# Patient Record
Sex: FEMALE | Race: WHITE
[De-identification: names, ages, dates, MRNs, and addresses within clinical notes are randomized per-mention and may not be internally consistent; named-entity substitution may affect disease eponyms.]

---

## 2018-02-28 ENCOUNTER — HOSPITAL ENCOUNTER (OUTPATIENT)
Dept: HOSPITAL 61 - KCIC MRI | Age: 68
Discharge: HOME | End: 2018-02-28
Attending: FAMILY MEDICINE
Payer: COMMERCIAL

## 2018-02-28 DIAGNOSIS — M43.16: ICD-10-CM

## 2018-02-28 DIAGNOSIS — M25.78: ICD-10-CM

## 2018-02-28 DIAGNOSIS — M48.061: Primary | ICD-10-CM

## 2018-02-28 DIAGNOSIS — M51.36: ICD-10-CM

## 2018-02-28 PROCEDURE — 72148 MRI LUMBAR SPINE W/O DYE: CPT

## 2018-03-05 ENCOUNTER — HOSPITAL ENCOUNTER (OUTPATIENT)
Dept: HOSPITAL 61 - PNCL | Age: 68
Discharge: HOME | End: 2018-03-05
Attending: ANESTHESIOLOGY
Payer: COMMERCIAL

## 2018-03-05 DIAGNOSIS — M48.061: ICD-10-CM

## 2018-03-05 DIAGNOSIS — M51.16: Primary | ICD-10-CM

## 2018-03-05 DIAGNOSIS — R53.83: ICD-10-CM

## 2018-03-19 ENCOUNTER — HOSPITAL ENCOUNTER (OUTPATIENT)
Dept: HOSPITAL 61 - KCIC | Age: 68
Discharge: HOME | End: 2018-03-19
Attending: NEUROLOGICAL SURGERY
Payer: COMMERCIAL

## 2018-03-19 ENCOUNTER — HOSPITAL ENCOUNTER (OUTPATIENT)
Dept: HOSPITAL 61 - PNCL | Age: 68
Discharge: HOME | End: 2018-03-19
Attending: ANESTHESIOLOGY
Payer: COMMERCIAL

## 2018-03-19 DIAGNOSIS — M81.8: ICD-10-CM

## 2018-03-19 DIAGNOSIS — M16.0: ICD-10-CM

## 2018-03-19 DIAGNOSIS — E11.9: ICD-10-CM

## 2018-03-19 DIAGNOSIS — Z88.8: ICD-10-CM

## 2018-03-19 DIAGNOSIS — M47.897: ICD-10-CM

## 2018-03-19 DIAGNOSIS — J40: ICD-10-CM

## 2018-03-19 DIAGNOSIS — M51.44: ICD-10-CM

## 2018-03-19 DIAGNOSIS — M48.02: ICD-10-CM

## 2018-03-19 DIAGNOSIS — M51.16: Primary | ICD-10-CM

## 2018-03-19 DIAGNOSIS — M41.82: ICD-10-CM

## 2018-03-19 DIAGNOSIS — M25.78: ICD-10-CM

## 2018-03-19 DIAGNOSIS — I10: ICD-10-CM

## 2018-03-19 DIAGNOSIS — M47.12: Primary | ICD-10-CM

## 2018-03-19 PROCEDURE — 73502 X-RAY EXAM HIP UNI 2-3 VIEWS: CPT

## 2018-03-19 PROCEDURE — 72141 MRI NECK SPINE W/O DYE: CPT

## 2018-03-19 PROCEDURE — 72146 MRI CHEST SPINE W/O DYE: CPT

## 2018-03-19 PROCEDURE — 62323 NJX INTERLAMINAR LMBR/SAC: CPT

## 2018-04-02 ENCOUNTER — HOSPITAL ENCOUNTER (OUTPATIENT)
Dept: HOSPITAL 61 - SURGPAT | Age: 68
Discharge: HOME | End: 2018-04-02
Attending: ORTHOPAEDIC SURGERY
Payer: COMMERCIAL

## 2018-04-02 DIAGNOSIS — M19.012: ICD-10-CM

## 2018-04-02 DIAGNOSIS — I10: ICD-10-CM

## 2018-04-02 DIAGNOSIS — R94.31: ICD-10-CM

## 2018-04-02 DIAGNOSIS — M19.011: ICD-10-CM

## 2018-04-02 DIAGNOSIS — Z01.818: Primary | ICD-10-CM

## 2018-04-02 LAB
ADD MAN DIFF?: NO
ALBUMIN SERPL-MCNC: 3.5 G/DL (ref 3.4–5)
ANION GAP SERPL CALC-SCNC: 11 MMOL/L (ref 6–14)
BACTERIA,URINE: (no result) /HPF
BASO #: 0.1 X10^3/UL (ref 0–0.2)
BASO %: 1 % (ref 0–3)
BILIRUBIN,URINE: NEGATIVE
BLOOD UREA NITROGEN: 36 MG/DL (ref 7–20)
CALCIUM: 9.6 MG/DL (ref 8.5–10.1)
CHLORIDE: 99 MMOL/L (ref 98–107)
CLARITY,URINE: CLEAR
CO2 SERPL-SCNC: 23 MMOL/L (ref 21–32)
COLOR,URINE: YELLOW
CREAT SERPL-MCNC: 1 MG/DL (ref 0.6–1)
EOS #: 0.2 X10^3/UL (ref 0–0.7)
EOS %: 1 % (ref 0–3)
GFR SERPLBLD BASED ON 1.73 SQ M-ARVRAT: 55.5 ML/MIN
GLUCOSE SERPL-MCNC: 155 MG/DL (ref 70–99)
GLUCOSE,URINE: 100 MG/DL
HCG SERPL-ACNC: 13.8 X10^3/UL (ref 4–11)
HEMATOCRIT: 40.3 % (ref 36–47)
HEMOGLOBIN: 13.4 G/DL (ref 12–15.5)
INR: 1 (ref 0.8–1.1)
LYMPH #: 2.6 X10^3/UL (ref 1–4.8)
LYMPH %: 19 % (ref 24–48)
MEAN CORPUSCULAR HEMOGLOBIN: 28 PG (ref 25–35)
MEAN CORPUSCULAR HGB CONC: 33 G/DL (ref 31–37)
MEAN CORPUSCULAR VOLUME: 85 FL (ref 79–100)
MONO #: 0.8 X10^3/UL (ref 0–1.1)
MONO %: 6 % (ref 0–9)
NEUT #: 10.2 X10^3UL (ref 1.8–7.7)
NEUT %: 74 % (ref 31–73)
NITRITE,URINE: NEGATIVE
PARTIAL THROMBOPLASTIN TIME: 28 SEC (ref 24–38)
PH,URINE: 5
PLATELET COUNT: 449 X10^3/UL (ref 140–400)
POTASSIUM SERPL-SCNC: 4.2 MMOL/L (ref 3.5–5.1)
PROTEIN,URINE: NEGATIVE MG/DL
PROTHROMBIN TIME PATIENT: 12.5 SEC (ref 11.7–14)
RBC,URINE: 0 /HPF (ref 0–2)
RED BLOOD COUNT: 4.75 X10^6/UL (ref 3.5–5.4)
RED CELL DISTRIBUTION WIDTH: 14.8 % (ref 11.5–14.5)
SEDIMENTATION RATE: 23 (ref 0–25)
SODIUM: 133 MMOL/L (ref 136–145)
SPECIFIC GRAVITY,URINE: 1.01
SQUAMOUS EPITHELIAL CELL,UR: (no result) /LPF
UROBILINOGEN,URINE: 0.2 MG/DL
WBC,URINE: (no result) /HPF (ref 0–4)

## 2018-04-02 PROCEDURE — 87641 MR-STAPH DNA AMP PROBE: CPT

## 2018-04-02 PROCEDURE — 83036 HEMOGLOBIN GLYCOSYLATED A1C: CPT

## 2018-04-02 PROCEDURE — 85651 RBC SED RATE NONAUTOMATED: CPT

## 2018-04-02 PROCEDURE — 85610 PROTHROMBIN TIME: CPT

## 2018-04-02 PROCEDURE — 93005 ELECTROCARDIOGRAM TRACING: CPT

## 2018-04-02 PROCEDURE — 82040 ASSAY OF SERUM ALBUMIN: CPT

## 2018-04-02 PROCEDURE — 87086 URINE CULTURE/COLONY COUNT: CPT

## 2018-04-02 PROCEDURE — 85025 COMPLETE CBC W/AUTO DIFF WBC: CPT

## 2018-04-02 PROCEDURE — 85730 THROMBOPLASTIN TIME PARTIAL: CPT

## 2018-04-02 PROCEDURE — 81001 URINALYSIS AUTO W/SCOPE: CPT

## 2018-04-02 PROCEDURE — 36415 COLL VENOUS BLD VENIPUNCTURE: CPT

## 2018-04-02 PROCEDURE — 71046 X-RAY EXAM CHEST 2 VIEWS: CPT

## 2018-04-02 PROCEDURE — 80048 BASIC METABOLIC PNL TOTAL CA: CPT

## 2018-04-03 LAB — HEMOGLOBIN A1C: 7.7 % (ref 4.8–5.6)

## 2018-04-19 ENCOUNTER — HOSPITAL ENCOUNTER (OUTPATIENT)
Dept: HOSPITAL 61 - NM | Age: 68
Discharge: HOME | End: 2018-04-19
Attending: INTERNAL MEDICINE
Payer: COMMERCIAL

## 2018-04-19 DIAGNOSIS — Z01.818: Primary | ICD-10-CM

## 2018-04-19 PROCEDURE — 96376 TX/PRO/DX INJ SAME DRUG ADON: CPT

## 2018-04-19 PROCEDURE — 78452 HT MUSCLE IMAGE SPECT MULT: CPT

## 2018-04-19 PROCEDURE — 93306 TTE W/DOPPLER COMPLETE: CPT

## 2018-04-19 PROCEDURE — 96374 THER/PROPH/DIAG INJ IV PUSH: CPT

## 2018-04-19 PROCEDURE — 93017 CV STRESS TEST TRACING ONLY: CPT

## 2018-04-19 PROCEDURE — 96375 TX/PRO/DX INJ NEW DRUG ADDON: CPT

## 2018-04-19 RX ADMIN — REGADENOSON 1 MG: 0.08 INJECTION, SOLUTION INTRAVENOUS at 10:26

## 2018-04-24 ENCOUNTER — HOSPITAL ENCOUNTER (INPATIENT)
Dept: HOSPITAL 61 - OPSVCIP | Age: 68
LOS: 3 days | Discharge: NURSING, REHAB OR LONG-TERM CARE | DRG: 470 | End: 2018-04-27
Attending: ORTHOPAEDIC SURGERY | Admitting: ORTHOPAEDIC SURGERY
Payer: COMMERCIAL

## 2018-04-24 DIAGNOSIS — I48.91: ICD-10-CM

## 2018-04-24 DIAGNOSIS — M16.11: Primary | ICD-10-CM

## 2018-04-24 DIAGNOSIS — M48.00: ICD-10-CM

## 2018-04-24 DIAGNOSIS — E11.9: ICD-10-CM

## 2018-04-24 DIAGNOSIS — I10: ICD-10-CM

## 2018-04-24 LAB
INR: 1.1 (ref 0.8–1.1)
PARTIAL THROMBOPLASTIN TIME: 33 SEC (ref 24–38)
POC GLUCOSE: 148 MG/DL (ref 70–99)
POC GLUCOSE: 188 MG/DL (ref 70–99)
POC GLUCOSE: 212 MG/DL (ref 70–99)
PROTHROMBIN TIME PATIENT: 13.8 SEC (ref 11.7–14)

## 2018-04-24 PROCEDURE — 82962 GLUCOSE BLOOD TEST: CPT

## 2018-04-24 PROCEDURE — 85018 HEMOGLOBIN: CPT

## 2018-04-24 PROCEDURE — 36415 COLL VENOUS BLD VENIPUNCTURE: CPT

## 2018-04-24 PROCEDURE — 97116 GAIT TRAINING THERAPY: CPT

## 2018-04-24 PROCEDURE — 97150 GROUP THERAPEUTIC PROCEDURES: CPT

## 2018-04-24 PROCEDURE — 86850 RBC ANTIBODY SCREEN: CPT

## 2018-04-24 PROCEDURE — 97535 SELF CARE MNGMENT TRAINING: CPT

## 2018-04-24 PROCEDURE — 73501 X-RAY EXAM HIP UNI 1 VIEW: CPT

## 2018-04-24 PROCEDURE — 97166 OT EVAL MOD COMPLEX 45 MIN: CPT

## 2018-04-24 PROCEDURE — 85014 HEMATOCRIT: CPT

## 2018-04-24 PROCEDURE — 86900 BLOOD TYPING SEROLOGIC ABO: CPT

## 2018-04-24 PROCEDURE — 0SR90JZ REPLACEMENT OF RIGHT HIP JOINT WITH SYNTHETIC SUBSTITUTE, OPEN APPROACH: ICD-10-PCS

## 2018-04-24 PROCEDURE — 97162 PT EVAL MOD COMPLEX 30 MIN: CPT

## 2018-04-24 PROCEDURE — 85730 THROMBOPLASTIN TIME PARTIAL: CPT

## 2018-04-24 PROCEDURE — 86901 BLOOD TYPING SEROLOGIC RH(D): CPT

## 2018-04-24 PROCEDURE — 72170 X-RAY EXAM OF PELVIS: CPT

## 2018-04-24 PROCEDURE — 85610 PROTHROMBIN TIME: CPT

## 2018-04-24 PROCEDURE — 97530 THERAPEUTIC ACTIVITIES: CPT

## 2018-04-24 RX ADMIN — Medication 1 UNIT: at 10:57

## 2018-04-24 RX ADMIN — FENTANYL CITRATE 1 MCG: 50 INJECTION INTRAMUSCULAR; INTRAVENOUS at 10:53

## 2018-04-24 RX ADMIN — AZELASTINE HYDROCHLORIDE 1 SPRAY: 137 SPRAY, METERED NASAL at 21:07

## 2018-04-24 RX ADMIN — LISINOPRIL 1 MG: 20 TABLET ORAL at 14:00

## 2018-04-24 RX ADMIN — MORPHINE SULFATE 1 MG: 4 INJECTION, SOLUTION INTRAMUSCULAR; INTRAVENOUS at 11:16

## 2018-04-24 RX ADMIN — FENTANYL CITRATE 1 MCG: 50 INJECTION INTRAMUSCULAR; INTRAVENOUS at 10:31

## 2018-04-24 RX ADMIN — TRANEXAMIC ACID 1 MLS/HR: 100 INJECTION, SOLUTION INTRAVENOUS at 09:30

## 2018-04-24 RX ADMIN — WARFARIN SODIUM 1 MG: 7.5 TABLET ORAL at 17:30

## 2018-04-24 RX ADMIN — SENNOSIDES AND DOCUSATE SODIUM 1 TAB: 8.6; 5 TABLET ORAL at 13:39

## 2018-04-24 RX ADMIN — MORPHINE SULFATE 1: 10 INJECTION INTRAMUSCULAR; INTRAVENOUS; SUBCUTANEOUS at 08:01

## 2018-04-24 RX ADMIN — TRANEXAMIC ACID 1 MLS/HR: 100 INJECTION, SOLUTION INTRAVENOUS at 07:40

## 2018-04-24 RX ADMIN — SODIUM CHLORIDE, SODIUM LACTATE, POTASSIUM CHLORIDE, AND CALCIUM CHLORIDE 1 MLS/HR: .6; .31; .03; .02 INJECTION, SOLUTION INTRAVENOUS at 07:21

## 2018-04-24 RX ADMIN — Medication 1 MG: at 17:30

## 2018-04-24 RX ADMIN — CELECOXIB 1 MG: 200 CAPSULE ORAL at 21:06

## 2018-04-24 RX ADMIN — DEXTROSE AND SODIUM CHLORIDE 1 MLS/HR: 5; .45 INJECTION, SOLUTION INTRAVENOUS at 17:05

## 2018-04-24 RX ADMIN — DEXTROSE AND SODIUM CHLORIDE 1 MLS/HR: 5; .45 INJECTION, SOLUTION INTRAVENOUS at 07:05

## 2018-04-24 RX ADMIN — KETOROLAC TROMETHAMINE 1 MLS/HR: 30 INJECTION, SOLUTION INTRAMUSCULAR at 17:32

## 2018-04-24 RX ADMIN — FENTANYL CITRATE 1 MCG: 50 INJECTION INTRAMUSCULAR; INTRAVENOUS at 10:45

## 2018-04-24 RX ADMIN — FENTANYL CITRATE 1 MCG: 50 INJECTION INTRAMUSCULAR; INTRAVENOUS at 10:20

## 2018-04-24 RX ADMIN — CELECOXIB 1 MG: 200 CAPSULE ORAL at 07:27

## 2018-04-24 RX ADMIN — MORPHINE SULFATE 1 MG: 4 INJECTION, SOLUTION INTRAMUSCULAR; INTRAVENOUS at 11:32

## 2018-04-25 LAB
HEMATOCRIT: 27.4 % (ref 36–47)
HEMOGLOBIN: 9.6 G/DL (ref 12–15.5)
INR: 1.6 (ref 0.8–1.1)
MEAN CORPUSCULAR HGB CONC: 35 G/DL (ref 31–37)
POC GLUCOSE: 135 MG/DL (ref 70–99)
POC GLUCOSE: 159 MG/DL (ref 70–99)
POC GLUCOSE: 165 MG/DL (ref 70–99)
PROTHROMBIN TIME PATIENT: 18.5 SEC (ref 11.7–14)

## 2018-04-25 RX ADMIN — Medication 1 EACH: at 10:51

## 2018-04-25 RX ADMIN — SENNOSIDES AND DOCUSATE SODIUM 1 TAB: 8.6; 5 TABLET ORAL at 08:32

## 2018-04-25 RX ADMIN — CELECOXIB 1 MG: 200 CAPSULE ORAL at 08:32

## 2018-04-25 RX ADMIN — AZELASTINE HYDROCHLORIDE 1 SPRAY: 137 SPRAY, METERED NASAL at 20:59

## 2018-04-25 RX ADMIN — LISINOPRIL 1 MG: 20 TABLET ORAL at 09:00

## 2018-04-25 RX ADMIN — WARFARIN SODIUM 1 MG: 3 TABLET ORAL at 17:18

## 2018-04-25 RX ADMIN — MULTIPLE VITAMINS W/ MINERALS TAB 1 TAB: TAB at 08:32

## 2018-04-25 RX ADMIN — Medication 1 MG: at 08:32

## 2018-04-25 RX ADMIN — CELECOXIB 1 MG: 200 CAPSULE ORAL at 20:59

## 2018-04-25 RX ADMIN — AZELASTINE HYDROCHLORIDE 1 SPRAY: 137 SPRAY, METERED NASAL at 08:35

## 2018-04-25 RX ADMIN — Medication 1 MG: at 17:18

## 2018-04-25 RX ADMIN — KETOROLAC TROMETHAMINE 1 MLS/HR: 30 INJECTION, SOLUTION INTRAMUSCULAR at 05:30

## 2018-04-25 RX ADMIN — Medication 1 EACH: at 10:46

## 2018-04-26 LAB
HEMATOCRIT: 25.8 % (ref 36–47)
HEMOGLOBIN: 8.9 G/DL (ref 12–15.5)
INR: 2.3 (ref 0.8–1.1)
MEAN CORPUSCULAR HGB CONC: 35 G/DL (ref 31–37)
POC GLUCOSE: 102 MG/DL (ref 70–99)
POC GLUCOSE: 103 MG/DL (ref 70–99)
POC GLUCOSE: 112 MG/DL (ref 70–99)
PROTHROMBIN TIME PATIENT: 24.9 SEC (ref 11.7–14)

## 2018-04-26 RX ADMIN — CELECOXIB 1 MG: 200 CAPSULE ORAL at 20:36

## 2018-04-26 RX ADMIN — Medication 1 MG: at 17:38

## 2018-04-26 RX ADMIN — WARFARIN SODIUM 1 MG: 1 TABLET ORAL at 17:39

## 2018-04-26 RX ADMIN — Medication 1 EACH: at 14:06

## 2018-04-26 RX ADMIN — AZELASTINE HYDROCHLORIDE 1 SPRAY: 137 SPRAY, METERED NASAL at 17:41

## 2018-04-26 RX ADMIN — Medication 1 MG: at 08:08

## 2018-04-26 RX ADMIN — POLYETHYLENE GLYCOL 3350 1 GM: 17 POWDER, FOR SOLUTION ORAL at 08:08

## 2018-04-26 RX ADMIN — MULTIPLE VITAMINS W/ MINERALS TAB 1 TAB: TAB at 08:08

## 2018-04-26 RX ADMIN — AZELASTINE HYDROCHLORIDE 1 SPRAY: 137 SPRAY, METERED NASAL at 20:36

## 2018-04-26 RX ADMIN — SENNOSIDES AND DOCUSATE SODIUM 1 TAB: 8.6; 5 TABLET ORAL at 08:08

## 2018-04-26 RX ADMIN — LISINOPRIL 1 MG: 20 TABLET ORAL at 09:00

## 2018-04-26 RX ADMIN — CELECOXIB 1 MG: 200 CAPSULE ORAL at 08:08

## 2018-04-27 LAB
HEMATOCRIT: 27.6 % (ref 36–47)
HEMOGLOBIN: 9.8 G/DL (ref 12–15.5)
INR: 1.9 (ref 0.8–1.1)
MEAN CORPUSCULAR HGB CONC: 36 G/DL (ref 31–37)
POC GLUCOSE: 112 MG/DL (ref 70–99)
PROTHROMBIN TIME PATIENT: 21.5 SEC (ref 11.7–14)

## 2018-04-27 RX ADMIN — Medication 1 MG: at 08:36

## 2018-04-27 RX ADMIN — AZELASTINE HYDROCHLORIDE 1 SPRAY: 137 SPRAY, METERED NASAL at 08:39

## 2018-04-27 RX ADMIN — SENNOSIDES AND DOCUSATE SODIUM 1 TAB: 8.6; 5 TABLET ORAL at 08:36

## 2018-04-27 RX ADMIN — CELECOXIB 1 MG: 200 CAPSULE ORAL at 08:36

## 2018-04-27 RX ADMIN — MULTIPLE VITAMINS W/ MINERALS TAB 1 TAB: TAB at 09:00

## 2018-04-27 RX ADMIN — WARFARIN SODIUM 1 MG: 2 TABLET ORAL at 13:04

## 2018-04-27 RX ADMIN — Medication 1 EACH: at 12:52

## 2018-05-01 LAB — POC GLUCOSE: 107 MG/DL (ref 70–99)

## 2018-05-31 ENCOUNTER — HOSPITAL ENCOUNTER (EMERGENCY)
Dept: HOSPITAL 61 - ER | Age: 68
Discharge: HOME | End: 2018-05-31
Payer: COMMERCIAL

## 2018-05-31 DIAGNOSIS — I10: ICD-10-CM

## 2018-05-31 DIAGNOSIS — Z91.041: ICD-10-CM

## 2018-05-31 DIAGNOSIS — E78.00: ICD-10-CM

## 2018-05-31 DIAGNOSIS — E11.9: ICD-10-CM

## 2018-05-31 DIAGNOSIS — I48.91: ICD-10-CM

## 2018-05-31 DIAGNOSIS — Z88.8: ICD-10-CM
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
## 2018-05-31 DIAGNOSIS — R04.0: Primary | ICD-10-CM

## 2018-05-31 DIAGNOSIS — T45.515A: ICD-10-CM

## 2018-05-31 LAB
ADD MAN DIFF?: NO
ALBUMIN SERPL-MCNC: 3.2 G/DL (ref 3.4–5)
ALBUMIN/GLOB SERPL: 0.9 {RATIO} (ref 1–1.7)
ALP SERPL-CCNC: 109 U/L (ref 46–116)
ALT (SGPT): 16 U/L (ref 14–59)
ANION GAP SERPL CALC-SCNC: 12 MMOL/L (ref 6–14)
AST SERPL-CCNC: 17 U/L (ref 15–37)
BACTERIA,URINE: 0 /HPF
BASO #: 0.1 X10^3/UL (ref 0–0.2)
BASO %: 1 % (ref 0–3)
BILIRUBIN,URINE: (no result)
BLOOD UREA NITROGEN: 21 MG/DL (ref 7–20)
BUN/CREAT SERPL: 21 (ref 6–20)
CALCIUM: 9 MG/DL (ref 8.5–10.1)
CHLORIDE: 96 MMOL/L (ref 98–107)
CK SERPL-CCNC: 77 U/L (ref 26–192)
CLARITY,URINE: (no result)
CO2 SERPL-SCNC: 24 MMOL/L (ref 21–32)
COLOR,URINE: (no result)
CREAT SERPL-MCNC: 1 MG/DL (ref 0.6–1)
EOS #: 0.1 X10^3/UL (ref 0–0.7)
EOS %: 1 % (ref 0–3)
GFR SERPLBLD BASED ON 1.73 SQ M-ARVRAT: 55.3 ML/MIN
GLOBULIN SER-MCNC: 3.7 G/DL (ref 2.2–3.8)
GLUCOSE SERPL-MCNC: 146 MG/DL (ref 70–99)
GLUCOSE,URINE: (no result) MG/DL
HCG SERPL-ACNC: 11.4 X10^3/UL (ref 4–11)
HEMATOCRIT: 29.1 % (ref 36–47)
HEMOGLOBIN: 10.2 G/DL (ref 12–15.5)
INR: 7.2 (ref 0.8–1.1)
LYMPH #: 1.4 X10^3/UL (ref 1–4.8)
LYMPH %: 12 % (ref 24–48)
MEAN CORPUSCULAR HEMOGLOBIN: 29 PG (ref 25–35)
MEAN CORPUSCULAR HGB CONC: 35 G/DL (ref 31–37)
MEAN CORPUSCULAR VOLUME: 83 FL (ref 79–100)
MONO #: 0.8 X10^3/UL (ref 0–1.1)
MONO %: 7 % (ref 0–9)
NEUT #: 8.9 X10^3UL (ref 1.8–7.7)
NEUT %: 78 % (ref 31–73)
NITRITE,URINE: (no result)
PARTIAL THROMBOPLASTIN TIME: > 150 SEC (ref 24–38)
PH,URINE: (no result)
PLATELET COUNT: 509 X10^3/UL (ref 140–400)
POTASSIUM SERPL-SCNC: 3.7 MMOL/L (ref 3.5–5.1)
PROTEIN,URINE: (no result) MG/DL
PROTHROMBIN TIME PATIENT: 61 SEC (ref 11.7–14)
RBC,URINE: (no result) /HPF (ref 0–2)
RED BLOOD COUNT: 3.49 X10^6/UL (ref 3.5–5.4)
RED CELL DISTRIBUTION WIDTH: 15.6 % (ref 11.5–14.5)
SODIUM: 132 MMOL/L (ref 136–145)
SPECIFIC GRAVITY,URINE: 1.01
TOTAL BILIRUBIN: 0.5 MG/DL (ref 0.2–1)
TOTAL PROTEIN: 6.9 G/DL (ref 6.4–8.2)
UROBILINOGEN,URINE: (no result) MG/DL
WBC,URINE: (no result) /HPF (ref 0–4)

## 2018-05-31 PROCEDURE — 86901 BLOOD TYPING SEROLOGIC RH(D): CPT

## 2018-05-31 PROCEDURE — 85025 COMPLETE CBC W/AUTO DIFF WBC: CPT

## 2018-05-31 PROCEDURE — 81001 URINALYSIS AUTO W/SCOPE: CPT

## 2018-05-31 PROCEDURE — 86900 BLOOD TYPING SEROLOGIC ABO: CPT

## 2018-05-31 PROCEDURE — 36415 COLL VENOUS BLD VENIPUNCTURE: CPT

## 2018-05-31 PROCEDURE — 80053 COMPREHEN METABOLIC PANEL: CPT

## 2018-05-31 PROCEDURE — 99284 EMERGENCY DEPT VISIT MOD MDM: CPT

## 2018-05-31 PROCEDURE — 85730 THROMBOPLASTIN TIME PARTIAL: CPT

## 2018-05-31 PROCEDURE — 85610 PROTHROMBIN TIME: CPT

## 2018-05-31 PROCEDURE — 82550 ASSAY OF CK (CPK): CPT

## 2018-05-31 PROCEDURE — 86850 RBC ANTIBODY SCREEN: CPT

## 2018-05-31 RX ADMIN — Medication 1 MG: at 13:47

## 2022-03-02 ENCOUNTER — HOSPITAL ENCOUNTER (EMERGENCY)
Dept: HOSPITAL 61 - ER | Age: 72
Discharge: HOME | End: 2022-03-02
Payer: MEDICARE

## 2022-03-02 VITALS — DIASTOLIC BLOOD PRESSURE: 72 MMHG | SYSTOLIC BLOOD PRESSURE: 149 MMHG

## 2022-03-02 VITALS — BODY MASS INDEX: 33.19 KG/M2 | WEIGHT: 180.34 LBS | HEIGHT: 62 IN

## 2022-03-02 DIAGNOSIS — E78.00: ICD-10-CM

## 2022-03-02 DIAGNOSIS — Z88.8: ICD-10-CM

## 2022-03-02 DIAGNOSIS — N28.9: ICD-10-CM

## 2022-03-02 DIAGNOSIS — Z88.5: ICD-10-CM

## 2022-03-02 DIAGNOSIS — I48.91: ICD-10-CM

## 2022-03-02 DIAGNOSIS — I10: ICD-10-CM

## 2022-03-02 DIAGNOSIS — E11.65: ICD-10-CM

## 2022-03-02 DIAGNOSIS — R04.0: Primary | ICD-10-CM

## 2022-03-02 LAB
ALBUMIN SERPL-MCNC: 3 G/DL (ref 3.4–5)
ALBUMIN/GLOB SERPL: 0.7 {RATIO} (ref 1–1.7)
ALP SERPL-CCNC: 113 U/L (ref 46–116)
ALT SERPL-CCNC: 23 U/L (ref 14–59)
ANION GAP SERPL CALC-SCNC: 11 MMOL/L (ref 6–14)
AST SERPL-CCNC: 14 U/L (ref 15–37)
BASOPHILS # BLD AUTO: 0.1 X10^3/UL (ref 0–0.2)
BASOPHILS NFR BLD: 1 % (ref 0–3)
BILIRUB SERPL-MCNC: 0.4 MG/DL (ref 0.2–1)
BUN SERPL-MCNC: 30 MG/DL (ref 7–20)
BUN/CREAT SERPL: 23 (ref 6–20)
CALCIUM SERPL-MCNC: 8.6 MG/DL (ref 8.5–10.1)
CHLORIDE SERPL-SCNC: 93 MMOL/L (ref 98–107)
CO2 SERPL-SCNC: 25 MMOL/L (ref 21–32)
CREAT SERPL-MCNC: 1.3 MG/DL (ref 0.6–1)
EOSINOPHIL NFR BLD: 0.3 X10^3/UL (ref 0–0.7)
EOSINOPHIL NFR BLD: 2 % (ref 0–3)
ERYTHROCYTE [DISTWIDTH] IN BLOOD BY AUTOMATED COUNT: 13.6 % (ref 11.5–14.5)
GFR SERPLBLD BASED ON 1.73 SQ M-ARVRAT: 40.5 ML/MIN
GLUCOSE SERPL-MCNC: 494 MG/DL (ref 70–99)
HCT VFR BLD CALC: 37.1 % (ref 36–47)
HGB BLD-MCNC: 12.3 G/DL (ref 12–15.5)
LYMPHOCYTES # BLD: 1.8 X10^3/UL (ref 1–4.8)
LYMPHOCYTES NFR BLD AUTO: 15 % (ref 24–48)
MCH RBC QN AUTO: 28 PG (ref 25–35)
MCHC RBC AUTO-ENTMCNC: 33 G/DL (ref 31–37)
MCV RBC AUTO: 85 FL (ref 79–100)
MONO #: 0.8 X10^3/UL (ref 0–1.1)
MONOCYTES NFR BLD: 7 % (ref 0–9)
NEUT #: 8.7 X10^3/UL (ref 1.8–7.7)
NEUTROPHILS NFR BLD AUTO: 74 % (ref 31–73)
PLATELET # BLD AUTO: 440 X10^3/UL (ref 140–400)
POTASSIUM SERPL-SCNC: 4.4 MMOL/L (ref 3.5–5.1)
PROT SERPL-MCNC: 7.4 G/DL (ref 6.4–8.2)
RBC # BLD AUTO: 4.35 X10^6/UL (ref 3.5–5.4)
SODIUM SERPL-SCNC: 129 MMOL/L (ref 136–145)
WBC # BLD AUTO: 11.7 X10^3/UL (ref 4–11)

## 2022-03-02 PROCEDURE — 85025 COMPLETE CBC W/AUTO DIFF WBC: CPT

## 2022-03-02 PROCEDURE — 36415 COLL VENOUS BLD VENIPUNCTURE: CPT

## 2022-03-02 PROCEDURE — 80053 COMPREHEN METABOLIC PANEL: CPT

## 2022-03-02 NOTE — PHYS DOC
Past Medical History


Past Medical History:  A-Fib, Diabetes-Type II, High Cholesterol, Hypertension, 

Other


Additional Past Medical Histor:  RECTAL BLEEDING


Past Surgical History:  Other


Additional Past Surgical Histo:  cardiac cath in november, R)hip replacement.


Smoking Status:  Never Smoker


Alcohol Use:  None


Drug Use:  None





General Adult


EDM:


Chief Complaint:  NOSEBLEED





HPI:


HPI:


71-year-old female with prior past medical history of hypertension, hyperlipi

demia and paroxysmal A. fib, presents to the ED with her sister, (patient 

consents to his/her/their knowledge and involvement in pts' medical care), with 

complaints of nosebleed that started earlier today, right nare.  Patient states 

she doesn't take any medications-she took her self off of all of her 

medications.  Has not seen a primary care physician Dr. Andres Montoya in 2-1/2 

years. Reports her blood pressure is normally not this elevated.  Has been 

undergoing significant stress and is currently living with her sister and 

sister's .  Denies any falls or head trauma.





Review of Systems:


Review of Systems:


Constitutional:   Denies fever or chills. []


Eyes:   Denies change in visual acuity. []


HENT:   Denies nasal congestion or sore throat. [] 


Respiratory:   Denies cough or shortness of breath. [] 


Cardiovascular:   Denies chest pain or edema. [] 


GI:   Denies nausea or vomiting


Musculoskeletal:   Denies back pain or joint pain. [] 


Integument:   Denies rash or diaphoresis


Neurologic:   Denies headache, focal weakness or sensory changes. [] 


Psychiatric:  Denies depression or anxiety or suicidal or homicidal ideations





Heart Score:


C/O Chest Pain:  No


Risk Factors:


Risk Factors:  DM, Current or recent (<one month) smoker, HTN, HLP, family 

history of CAD, obesity.


Risk Scores:


Score 0 - 3:  2.5% MACE over next 6 weeks - Discharge Home


Score 4 - 6:  20.3% MACE over next 6 weeks - Admit for Clinical Observation


Score 7 - 10:  72.7% MACE over next 6 weeks - Early Invasive Strategies





Current Medications:





Current Medications








 Medications


  (Trade)  Dose


 Ordered  Sig/Hawa  Start Time


 Stop Time Status Last Admin


Dose Admin


 


 Oxymetazoline HCl


  (Afrin)  2 spray  1X  ONCE  3/2/22 05:00


 3/2/22 05:01 DC 3/2/22 04:51


2 SPRAY











Allergies:


Allergies:





Allergies








Coded Allergies Type Severity Reaction Last Updated Verified


 


  atorvastatin Allergy Intermediate  4/4/18 Yes


 


  enoxaparin Adverse Reaction Severe  6/25/18 Yes


 


  warfarin Adverse Reaction Severe  6/25/18 Yes


 


  adhesive tape Adverse Reaction Intermediate Rash 4/4/18 Yes


 


  diphenhydramine Adverse Reaction Intermediate LIGHTHEADED 11/13/14 No











Physical Exam:


PE:





Constitutional:  no acute distress, non-toxic appearance. 


HENT: Normocephalic, atraumatic, no brisk bleeding in oropharynx, scant blood in

 right nare


Eyes: EOMI, conjunctiva normal, no discharge.  


Neck: Normal range of motion,  supple, 


Cardiovascular: S1/2 present, regular rhythm


Lungs & Thorax: Speaking in full sentences, bilateral equal chest rise, no 

tachypnea or increased work of breathing


Abdomen:  soft, no tenderness, 


Skin: Warm, dry, no erythema, no rash. [] 


Extremities: No tenderness, no cyanosis, 


Neurologic: Alert and oriented X 3, normal motor function, normal sensory 

function, no focal deficits noted. []


Psychologic: Affect normal, judgement normal, mood -appears slightly anxious





Current Patient Data:


Labs:





                                Laboratory Tests








Test


 3/2/22


04:48


 


White Blood Count


 11.7 x10^3/uL


(4.0-11.0)  H


 


Red Blood Count


 4.35 x10^6/uL


(3.50-5.40)


 


Hemoglobin


 12.3 g/dL


(12.0-15.5)


 


Hematocrit


 37.1 %


(36.0-47.0)


 


Mean Corpuscular Volume


 85 fL ()





 


Mean Corpuscular Hemoglobin 28 pg (25-35)  


 


Mean Corpuscular Hemoglobin


Concent 33 g/dL


(31-37)


 


Red Cell Distribution Width


 13.6 %


(11.5-14.5)


 


Platelet Count


 440 x10^3/uL


(140-400)  H


 


Neutrophils (%) (Auto) 74 % (31-73)  H


 


Lymphocytes (%) (Auto) 15 % (24-48)  L


 


Monocytes (%) (Auto) 7 % (0-9)  


 


Eosinophils (%) (Auto) 2 % (0-3)  


 


Basophils (%) (Auto) 1 % (0-3)  


 


Neutrophils # (Auto)


 8.7 x10^3/uL


(1.8-7.7)  H


 


Lymphocytes # (Auto)


 1.8 x10^3/uL


(1.0-4.8)


 


Monocytes # (Auto)


 0.8 x10^3/uL


(0.0-1.1)


 


Eosinophils # (Auto)


 0.3 x10^3/uL


(0.0-0.7)


 


Basophils # (Auto)


 0.1 x10^3/uL


(0.0-0.2)


 


Sodium Level


 129 mmol/L


(136-145)  L


 


Potassium Level


 4.4 mmol/L


(3.5-5.1)


 


Chloride Level


 93 mmol/L


()  L


 


Carbon Dioxide Level


 25 mmol/L


(21-32)


 


Anion Gap 11 (6-14)  


 


Blood Urea Nitrogen


 30 mg/dL


(7-20)  H


 


Creatinine


 1.3 mg/dL


(0.6-1.0)  H


 


Estimated GFR


(Cockcroft-Gault) 40.5  





 


BUN/Creatinine Ratio 23 (6-20)  H


 


Glucose Level


 494 mg/dL


(70-99)  H


 


Calcium Level


 8.6 mg/dL


(8.5-10.1)


 


Total Bilirubin


 0.4 mg/dL


(0.2-1.0)


 


Aspartate Amino Transferase


(AST) 14 U/L (15-37)


L


 


Alanine Aminotransferase (ALT)


 23 U/L (14-59)





 


Alkaline Phosphatase


 113 U/L


()


 


Total Protein


 7.4 g/dL


(6.4-8.2)


 


Albumin


 3.0 g/dL


(3.4-5.0)  L


 


Albumin/Globulin Ratio


 0.7 (1.0-1.7)


L





                                Laboratory Tests


3/2/22 04:48








                                Laboratory Tests


3/2/22 04:48








Vital Signs:





                                   Vital Signs








  Date Time  Temp Pulse Resp B/P (MAP) Pulse Ox O2 Delivery O2 Flow Rate FiO2


 


3/2/22 04:08 98.1 96 20 186/87 (120) 96 Room Air  





 98.1       











EKG:


EKG:


[]





Radiology/Procedures:


Radiology/Procedures:


[]





Course & Med Decision Making:


Course & Med Decision Making


Pertinent Labs and Imaging studies reviewed. (See chart for details)





Concern for spontaneous right epistaxis with hemostasis.  Was observed in the ED

 -had recurrent blooding that spontaneously stopped prior to any intervention.  

Afrin was given.  Repeat blood pressure was 154/74.  Patient not on any 

Coumadin-reports she was only on it for 3 months, A. fib spontaneously resolved.

  Patient was educated regarding her labs including renal insufficiency and 

hyperglycemia.  I encourage patient to follow-up with her primary care physician

 in 1 to 2 weeks to repeat her blood pressure, hemoglobin A1c and labs. She was 

educated on her increased risk of stroke and the benefits of medical management.

  Will discharge home with strict ED return precautions were given for syncope, 

brisk bleeding, or difficulties breathing. Encouraged urgent outpatient follow-

up with PMD.  Life-threatening processes were considered but are low suspicion 

at this time, given history, physical exam and ED workup. Pt was educated on all

 prescription medications and adverse effects.  All patient's questions were 

answered and pt was stable at time of discharge.





Life/limb-threatening differential includes but is not limited to, 

thrombocytopenia, drug related adverse event, gastrointestinal bleeding, 

posterior epistaxis, hemorrhagic shock, DIC, life-threatening rash, arterial 

injury or trauma.





I have spoken with the patient and/or caregivers.  I explained the patient's 

condition, diagnoses and treatment plan based on the information available to me

 at this time.  I have answered the patient and/or caregiver's questions and 

addressed any concerns.  The patient and/or caregivers have a good understanding

 of patient's diagnosis, condition and treatment plan as can be expected at this

 point.  Vital signs have been stable.  Patient's condition is stable and 

appropriate for discharge from the emergency department. 





Patient will pursue further outpatient evaluation with primary care physician or

 other designated or consulting physician as outlined in the discharge 

instructions.  The patient and/or caregivers are agreeable to this plan of care 

and follow-up instructions have been explained in detail.  The patient and/or 

caregivers have received these instructions in written form and have expressed 

an understanding of the discharge instructions.  The patient and/or caregivers 

are aware that any significant change of condition or worsening of symptoms 

should prompt immediate return to this or the closest emergency department or 

call to 911.





[place] Disclaimer:


Dragon Disclaimer:


This electronic medical record was generated, in whole or in part, using a voice

 recognition dictation system.





Departure


Departure


Impression:  


   Primary Impression:  


   Epistaxis


   Additional Impressions:  


   Hyperglycemia


   Renal insufficiency


   Uncontrolled hypertension


Disposition:  01 HOME / SELF CARE / HOMELESS


Condition:  STABLE


Referrals:  


ANDRES HUERTAS MD (PCP)


follow up in 1 week for HgA1c, blood pressure and kidney evaluation


Patient Instructions:  Hyperglycemia, Easy-to-Read, Hypertension, Nosebleed





Additional Instructions:  


EMERGENCY DEPARTMENT GENERAL DISCHARGE INSTRUCTIONS





Thank you for coming to [facility] Emergency Department (ED) 

today and 


trusting us with you care.  We trust that you had a positive experience in our 

Emergency 


Department.  If you wish to speak to the department management, you may call the

 Director at 


[phone].





YOUR FOLLOW UP INSTRUCTIONS ARE AS FOLLOWS:





1.  Do you have a private Doctor?  If you do not have a private doctor, please 

ask for a 


resource list of physicians or clinics that may be able to assist you with 

follow up care.





2.  The Emergency Physicain has interpreted your x-rays.  The X-Ray specialist 

will also 


review them.  If there is a change in the findings, you will be notified in 48 

hours when at 


all possible.





3.  A lab test or culture has been done, your results will be reviewed and you 

will be 


notified if you need a change in treatment.





ADDITIONAL INSTRUCTIONS AND INFORMATION:





1.  Your care today has been supervised by a physician who is specially trained 

in emergency 


care.  Many problems require more than one evaluation for a complete diagnosis 

and 


treatment.  We recommend that you schedule your follow up appointment as 

recommended to 


ensure complete treatment of you illness or injury.  If you are unable to obtain

 follow up 


care and continue to have a problem, or if your condition worsens, we recommend 

that you 


return to the ED.





2.  We are not able to safely determine your condition over the phone nor are we

 able to 


give sound medical advice over the phone.  For these safety reasons, if you call

 for medical 


advice we will ask you to come to the ED for further evaluation.





3.  If you have any questions regarding these discharge instructions please call

 the ED at 


[phone].





SAFETY INFORMATION:





In the interest of safety, wellness, and injury prevention; we encourage you to 

wear your 


sealbelt, if you smoke; quite smoking, and we encourage family to use a 

protective helmet 


for bicycling and other sporting events that present an increased risk for head 

injury.





IF YOUR SYMPTOMS WORSEN OR NEW SYMPTOMS DEVELOP, OR YOU HAVE CONCERNS ABOUT YOUR

 CONDITION; 


OR IF YOUR CONDITION WORSENS WHILE YOU ARE WAITING FOR YOUR FOLLOW UP 

APPOINTMENT; EITHER 


CONTACT YOUR PRIMARY CARE DOCTOR, THE PHYSICIAN WHOSE NAME AND NUMBER YOU WERE 

GIVEN, OR 


RETURN TO THE ED IMMEDIATELY.











JATIN ARANDA DO                Mar 2, 2022 05:25

## 2022-05-29 ENCOUNTER — HOSPITAL ENCOUNTER (INPATIENT)
Dept: HOSPITAL 61 - ER | Age: 72
LOS: 3 days | Discharge: HOME | DRG: 871 | End: 2022-06-01
Attending: INTERNAL MEDICINE | Admitting: INTERNAL MEDICINE
Payer: MEDICARE

## 2022-05-29 VITALS — WEIGHT: 181.88 LBS | HEIGHT: 62 IN | BODY MASS INDEX: 33.47 KG/M2

## 2022-05-29 VITALS — DIASTOLIC BLOOD PRESSURE: 44 MMHG | SYSTOLIC BLOOD PRESSURE: 103 MMHG

## 2022-05-29 VITALS — DIASTOLIC BLOOD PRESSURE: 53 MMHG | SYSTOLIC BLOOD PRESSURE: 102 MMHG

## 2022-05-29 VITALS — SYSTOLIC BLOOD PRESSURE: 150 MMHG | DIASTOLIC BLOOD PRESSURE: 76 MMHG

## 2022-05-29 DIAGNOSIS — E78.5: ICD-10-CM

## 2022-05-29 DIAGNOSIS — N18.9: ICD-10-CM

## 2022-05-29 DIAGNOSIS — N10: ICD-10-CM

## 2022-05-29 DIAGNOSIS — I70.0: ICD-10-CM

## 2022-05-29 DIAGNOSIS — E11.65: ICD-10-CM

## 2022-05-29 DIAGNOSIS — M43.17: ICD-10-CM

## 2022-05-29 DIAGNOSIS — I48.91: ICD-10-CM

## 2022-05-29 DIAGNOSIS — D75.839: ICD-10-CM

## 2022-05-29 DIAGNOSIS — E87.1: ICD-10-CM

## 2022-05-29 DIAGNOSIS — K57.30: ICD-10-CM

## 2022-05-29 DIAGNOSIS — I12.9: ICD-10-CM

## 2022-05-29 DIAGNOSIS — A41.9: Primary | ICD-10-CM

## 2022-05-29 DIAGNOSIS — E11.22: ICD-10-CM

## 2022-05-29 DIAGNOSIS — I25.10: ICD-10-CM

## 2022-05-29 DIAGNOSIS — N17.0: ICD-10-CM

## 2022-05-29 DIAGNOSIS — Z79.84: ICD-10-CM

## 2022-05-29 DIAGNOSIS — N13.6: ICD-10-CM

## 2022-05-29 DIAGNOSIS — E78.00: ICD-10-CM

## 2022-05-29 DIAGNOSIS — Z96.641: ICD-10-CM

## 2022-05-29 DIAGNOSIS — M16.12: ICD-10-CM

## 2022-05-29 LAB
% BANDS: 4 % (ref 0–9)
% LYMPHS: 3 % (ref 24–48)
% MONOS: 5 % (ref 0–10)
% SEGS: 88 % (ref 35–66)
ALBUMIN SERPL-MCNC: 3.1 G/DL (ref 3.4–5)
ALBUMIN/GLOB SERPL: 0.7 {RATIO} (ref 1–1.7)
ALP SERPL-CCNC: 97 U/L (ref 46–116)
ALT SERPL-CCNC: 16 U/L (ref 14–59)
ANION GAP SERPL CALC-SCNC: 12 MMOL/L (ref 6–14)
AST SERPL-CCNC: 10 U/L (ref 15–37)
BACTERIA #/AREA URNS HPF: (no result) /HPF
BASOPHILS # BLD AUTO: 0.1 X10^3/UL (ref 0–0.2)
BASOPHILS NFR BLD: 0 % (ref 0–3)
BILIRUB SERPL-MCNC: 0.4 MG/DL (ref 0.2–1)
BUN SERPL-MCNC: 37 MG/DL (ref 7–20)
BUN/CREAT SERPL: 23 (ref 6–20)
CALCIUM SERPL-MCNC: 9.9 MG/DL (ref 8.5–10.1)
CHLORIDE SERPL-SCNC: 97 MMOL/L (ref 98–107)
CO2 SERPL-SCNC: 21 MMOL/L (ref 21–32)
CREAT SERPL-MCNC: 1.6 MG/DL (ref 0.6–1)
EOSINOPHIL NFR BLD: 0 % (ref 0–3)
EOSINOPHIL NFR BLD: 0 X10^3/UL (ref 0–0.7)
ERYTHROCYTE [DISTWIDTH] IN BLOOD BY AUTOMATED COUNT: 14.8 % (ref 11.5–14.5)
GFR SERPLBLD BASED ON 1.73 SQ M-ARVRAT: 31.8 ML/MIN
GLUCOSE SERPL-MCNC: 393 MG/DL (ref 70–99)
HCT VFR BLD CALC: 36.9 % (ref 36–47)
HGB BLD-MCNC: 12.4 G/DL (ref 12–15.5)
LYMPHOCYTES # BLD: 0.8 X10^3/UL (ref 1–4.8)
LYMPHOCYTES NFR BLD AUTO: 3 % (ref 24–48)
MCH RBC QN AUTO: 28 PG (ref 25–35)
MCHC RBC AUTO-ENTMCNC: 34 G/DL (ref 31–37)
MCV RBC AUTO: 84 FL (ref 79–100)
MONO #: 1 X10^3/UL (ref 0–1.1)
MONOCYTES NFR BLD: 4 % (ref 0–9)
NEUT #: 24.1 X10^3/UL (ref 1.8–7.7)
NEUTROPHILS NFR BLD AUTO: 92 % (ref 31–73)
PLATELET # BLD AUTO: 435 X10^3/UL (ref 140–400)
PLATELET # BLD EST: (no result) 10*3/UL
POTASSIUM SERPL-SCNC: 4.9 MMOL/L (ref 3.5–5.1)
PROT SERPL-MCNC: 7.8 G/DL (ref 6.4–8.2)
RBC # BLD AUTO: 4.41 X10^6/UL (ref 3.5–5.4)
RBC #/AREA URNS HPF: 0 /HPF (ref 0–2)
SODIUM SERPL-SCNC: 130 MMOL/L (ref 136–145)
WBC # BLD AUTO: 26.1 X10^3/UL (ref 4–11)
WBC #/AREA URNS HPF: (no result) /HPF (ref 0–4)

## 2022-05-29 PROCEDURE — 36415 COLL VENOUS BLD VENIPUNCTURE: CPT

## 2022-05-29 PROCEDURE — 80048 BASIC METABOLIC PNL TOTAL CA: CPT

## 2022-05-29 PROCEDURE — 81001 URINALYSIS AUTO W/SCOPE: CPT

## 2022-05-29 PROCEDURE — 83036 HEMOGLOBIN GLYCOSYLATED A1C: CPT

## 2022-05-29 PROCEDURE — G0378 HOSPITAL OBSERVATION PER HR: HCPCS

## 2022-05-29 PROCEDURE — 87086 URINE CULTURE/COLONY COUNT: CPT

## 2022-05-29 PROCEDURE — 80053 COMPREHEN METABOLIC PANEL: CPT

## 2022-05-29 PROCEDURE — 82962 GLUCOSE BLOOD TEST: CPT

## 2022-05-29 PROCEDURE — 87186 SC STD MICRODIL/AGAR DIL: CPT

## 2022-05-29 PROCEDURE — 83735 ASSAY OF MAGNESIUM: CPT

## 2022-05-29 PROCEDURE — 85007 BL SMEAR W/DIFF WBC COUNT: CPT

## 2022-05-29 PROCEDURE — 83605 ASSAY OF LACTIC ACID: CPT

## 2022-05-29 PROCEDURE — 84100 ASSAY OF PHOSPHORUS: CPT

## 2022-05-29 PROCEDURE — 96361 HYDRATE IV INFUSION ADD-ON: CPT

## 2022-05-29 PROCEDURE — 73560 X-RAY EXAM OF KNEE 1 OR 2: CPT

## 2022-05-29 PROCEDURE — 74176 CT ABD & PELVIS W/O CONTRAST: CPT

## 2022-05-29 PROCEDURE — 96374 THER/PROPH/DIAG INJ IV PUSH: CPT

## 2022-05-29 PROCEDURE — 87077 CULTURE AEROBIC IDENTIFY: CPT

## 2022-05-29 PROCEDURE — 85025 COMPLETE CBC W/AUTO DIFF WBC: CPT

## 2022-05-29 RX ADMIN — BACITRACIN SCH MLS/HR: 5000 INJECTION, POWDER, FOR SOLUTION INTRAMUSCULAR at 17:19

## 2022-05-29 RX ADMIN — HEPARIN SODIUM SCH UNIT: 5000 INJECTION, SOLUTION INTRAVENOUS; SUBCUTANEOUS at 21:31

## 2022-05-29 RX ADMIN — INSULIN LISPRO SCH UNITS: 100 INJECTION, SOLUTION INTRAVENOUS; SUBCUTANEOUS at 17:32

## 2022-05-29 NOTE — PDOC1
History and Physical


Date of Service:


DOS:


DATE: 5/29/22 


TIME: 15:21





Chief Complaint:


Chief Complain:


Lower abdominal pain





History of Present Illness:


HPI:


72-year-old female with past medical history of diabetes mellitus type 2, 

dyslipidemia, hypertension who comes in after having left suprapubic and 

inguinal pain after bowel movement.  She also had some discomfort in the area 

after urination.  She does not describe it as burning but more of a pinching and

strain like sensation.  Hit her but bowel movements were nonbloody and she does 

endorse normal passing of gas.  Some nausea and vomiting.  Denies any history of

UTIs, fevers, chest pain, shortness of breath or hematuria.  Patient has had a 

normal colonoscopy in the past.





Past Medical/Surgical History:


PMH/PSH:


Past Medical History:  A-Fib, Diabetes-Type II, High Cholesterol, Hypertension, 

RECTAL BLEEDING


Past Surgical History: cardiac cath in november, R hip replacement.





Allergies:


Allergies:  


Coded Allergies:  


     atorvastatin (Verified  Allergy, Intermediate, 4/4/18)


   MUSCULAR PAIN


     enoxaparin (Verified  Adverse Reaction, Severe, 6/25/18)


   Bleeding out of every orfice


     warfarin (Verified  Adverse Reaction, Severe, 6/25/18)


   bleeding out of every Orfice


     adhesive tape (Verified  Adverse Reaction, Intermediate, Rash, 4/4/18)


     diphenhydramine (Unverified  Adverse Reaction, Intermediate, LIGHTHEADED, 

11/13/14)





Family History:


Family History:


Reviewed with no relative findings in the chart





Social History:


Social History:


Smoking Status:  Never Smoker


Alcohol Use:  None


Drug Use:  None





Current Medications:


Current Medications





Current Medications


Cephalexin HCl (Keflex) 500 mg 1X  ONCE PO  Last administered on 5/29/22at 

13:33;  Start 5/29/22 at 13:15;  Stop 5/29/22 at 13:16;  Status DC


Ondansetron HCl (Zofran Odt) 4 mg 1X  ONCE PO  Last administered on 5/29/22at 

13:33;  Start 5/29/22 at 13:15;  Stop 5/29/22 at 13:16;  Status DC


Sodium Chloride 1,000 ml @  0 mls/hr 1X  ONCE IV ;  Start 5/29/22 at 14:00;  

Stop 5/29/22 at 13:53;  Status DC


Sodium Chloride 1,000 ml @  1,000 mls/hr 1X  ONCE IV  Last administered on 

5/29/22at 13:54;  Start 5/29/22 at 14:00;  Stop 5/29/22 at 14:59;  Status DC


Ceftriaxone Sodium (Rocephin) 1 gm 1X  ONCE IVP  Last administered on 5/29/22at 

14:41;  Start 5/29/22 at 14:00;  Stop 5/29/22 at 14:02;  Status DC


Sodium Chloride 1,000 ml @  1,000 mls/hr 1X  ONCE IV ;  Start 5/29/22 at 15:30; 

Stop 5/29/22 at 16:29;  Status UNV





Active Scripts


Active


Ondansetron Odt (Ondansetron) 4 Mg Tab.rapdis 1 Tab PO PRN Q6-8HRS


Keflex (Cephalexin) 500 Mg Capsule 1 Cap PO BID 7 Days


Reported


Senna-Docusate Sodium Tablet (Sennosides/Docusate Sodium) 1 Each Tablet 1 Each 

PO DAILY


Tramadol Hcl 50 Mg Tablet 50 Mg PO Q4HRS PRN


Azelastine Hcl 137 Mcg/0.137 Ml [place].pump 1 [place] NS BID


Cardizem Cd (Diltiazem Hcl) 240 Mg Cap.er.24h 240 Mg PO DAILY


Lisinopril-Hctz 20-25 Mg Tab (Lisinopril/Hydrochlorothiazide) 1 Each Tablet 1 

Tab PO DAILY


Metformin Hcl 500 Mg Tablet 500 Mg PO BIDWMEALS





ROS:


Review of Systems


Review of System


REVIEW OF SYSTEMS:


GENERAL:  Denies weakness


SKIN:  No bruising, hair changes or rashes.


EYES:  No blurred, double or loss of vision.


NOSE AND THROAT:  No history of nosebleeds, hoarseness or sore throat.


HEART:  No history of palpitations, chest pain or shortness of breath on


exertion.


LUNGS:  Denies cough, hemoptysis, wheezing or shortness of breath.


GASTROINTESTINAL:  Denies changes in appetite, nausea, vomiting, diarrhea or


constipation.


GENITOURINARY:  No history of frequency, urgency, hesitancy or nocturia.


NEUROLOGIC:  Denies history of numbness, tingling, or tremor.


PSYCHIATRIC:  No history of panic, anxiety or depression.


ENDOCRINE:  No history of heat or cold intolerance, polyuria or polydipsia.


EXTREMITIES:  Denies joint pain, pain on walking or stiffness.





Physical Exam:


Vital Signs:





Vital Signs








  Date Time  Temp Pulse Resp B/P (MAP) Pulse Ox O2 Delivery O2 Flow Rate FiO2


 


5/29/22 14:36  89 25 164/72 (102) 97 Room Air  


 


5/29/22 13:27 99.0       





 99.0       








Physcial Exam:


General: Well developed, well nourished, no acute distress, well appearing


HEENT:  Pupils equally round and reactive to light, EOMI, no discharge, normal 

conjunctiva


Neck:  Supple, no nuchal rigidity, no JVD, trachea midline, no tenderness


Cardiac:  RRR, no murmurs, no gallops, no rubs


Chest/Lungs:  CTAB, no wheeze, no rhonchi, no crackles


Abdomen: soft, non-distended, no guarding, no peritoneal signs, mild tenderness 

in the left lower quadrant


Back:  No tenderness


Extremities:  no edema, pulses intact, non-tender,capillary refill <3 sec 

bilateral upper and lower extremities left knee pain.  Limited range of motion 

unable to fully extend left lower extremity,


Neuro:  Alert and oriented x 4, no focal deficits, normal speech





Labs:


Labs:





Laboratory Tests








Test


 5/29/22


11:55 5/29/22


12:41 5/29/22


14:39


 


Urine Collection Type Unknown   


 


Urine Color (Auto) Light yellow   


 


Urine Turbidity Hazy   


 


Urine pH (Auto) 5.5 (<5.0-8.0)   


 


Urine Specific Gravity


 1.017


(1.000-1.030) 


 





 


Urine Protein (Auto)


 Negative mg/dL


(Negative) 


 





 


Urine Glucose (Auto)(UA)


 >=1000 mg/dL


(Negative) 


 





 


Urine Ketones (Auto)


 Negative mg/dL


(Negative) 


 





 


Urine Blood (Auto)


 Trace


(Negative) 


 





 


Urine Nitrite


 Negative


(Negative) 


 





 


Urine Bilirubin (Auto)


 Negative


(Negative) 


 





 


Urine Urobilinogen (Auto)


 Normal mg/dL


(Normal) 


 





 


Urine Leukocyte Esterase


(Auto) Large


(Negative) 


 





 


Urine RBC 0 /HPF (0-2)   


 


Urine WBC


 Tntc /HPF


(0-4) 


 





 


Urine Bacteria


 Many /HPF


(0-FEW) 


 





 


White Blood Count


 


 26.1 x10^3/uL


(4.0-11.0) 





 


Red Blood Count


 


 4.41 x10^6/uL


(3.50-5.40) 





 


Hemoglobin


 


 12.4 g/dL


(12.0-15.5) 





 


Hematocrit


 


 36.9 %


(36.0-47.0) 





 


Mean Corpuscular Volume  84 fL ()  


 


Mean Corpuscular Hemoglobin  28 pg (25-35)  


 


Mean Corpuscular Hemoglobin


Concent 


 34 g/dL


(31-37) 





 


Red Cell Distribution Width


 


 14.8 %


(11.5-14.5) 





 


Platelet Count


 


 435 x10^3/uL


(140-400) 





 


Neutrophils (%) (Auto)  92 % (31-73)  


 


Lymphocytes (%) (Auto)  3 % (24-48)  


 


Monocytes (%) (Auto)  4 % (0-9)  


 


Eosinophils (%) (Auto)  0 % (0-3)  


 


Basophils (%) (Auto)  0 % (0-3)  


 


Neutrophils # (Auto)


 


 24.1 x10^3/uL


(1.8-7.7) 





 


Lymphocytes # (Auto)


 


 0.8 x10^3/uL


(1.0-4.8) 





 


Monocytes # (Auto)


 


 1.0 x10^3/uL


(0.0-1.1) 





 


Eosinophils # (Auto)


 


 0.0 x10^3/uL


(0.0-0.7) 





 


Basophils # (Auto)


 


 0.1 x10^3/uL


(0.0-0.2) 





 


Segmented Neutrophils %  88 % (35-66)  


 


Band Neutrophils %  4 % (0-9)  


 


Lymphocytes %  3 % (24-48)  


 


Monocytes %  5 % (0-10)  


 


Platelet Estimate


 


 Increased


(ADEQUATE) 





 


Sodium Level


 


 130 mmol/L


(136-145) 





 


Potassium Level


 


 4.9 mmol/L


(3.5-5.1) 





 


Chloride Level


 


 97 mmol/L


() 





 


Carbon Dioxide Level


 


 21 mmol/L


(21-32) 





 


Anion Gap  12 (6-14)  


 


Blood Urea Nitrogen


 


 37 mg/dL


(7-20) 





 


Creatinine


 


 1.6 mg/dL


(0.6-1.0) 





 


Estimated GFR


(Cockcroft-Gault) 


 31.8 


 





 


BUN/Creatinine Ratio  23 (6-20)  


 


Glucose Level


 


 393 mg/dL


(70-99) 





 


Calcium Level


 


 9.9 mg/dL


(8.5-10.1) 





 


Total Bilirubin


 


 0.4 mg/dL


(0.2-1.0) 





 


Aspartate Amino Transf


(AST/SGOT) 


 10 U/L (15-37) 


 





 


Alanine Aminotransferase


(ALT/SGPT) 


 16 U/L (14-59) 


 





 


Alkaline Phosphatase


 


 97 U/L


() 





 


Total Protein


 


 7.8 g/dL


(6.4-8.2) 





 


Albumin


 


 3.1 g/dL


(3.4-5.0) 





 


Albumin/Globulin Ratio  0.7 (1.0-1.7)  


 


Lactic Acid Level


 


 


 2.7 mmol/L


(0.4-2.0)








Laboratory Tests








Test


 5/29/22


11:55 5/29/22


12:41 5/29/22


14:39


 


Urine Collection Type Unknown   


 


Urine Color (Auto) Light yellow   


 


Urine Turbidity Hazy   


 


Urine pH (Auto) 5.5 (<5.0-8.0)   


 


Urine Specific Gravity


 1.017


(1.000-1.030) 


 





 


Urine Protein (Auto)


 Negative mg/dL


(Negative) 


 





 


Urine Glucose (Auto)(UA)


 >=1000 mg/dL


(Negative) 


 





 


Urine Ketones (Auto)


 Negative mg/dL


(Negative) 


 





 


Urine Blood (Auto)


 Trace


(Negative) 


 





 


Urine Nitrite


 Negative


(Negative) 


 





 


Urine Bilirubin (Auto)


 Negative


(Negative) 


 





 


Urine Urobilinogen (Auto)


 Normal mg/dL


(Normal) 


 





 


Urine Leukocyte Esterase


(Auto) Large


(Negative) 


 





 


Urine RBC 0 /HPF (0-2)   


 


Urine WBC


 Tntc /HPF


(0-4) 


 





 


Urine Bacteria


 Many /HPF


(0-FEW) 


 





 


White Blood Count


 


 26.1 x10^3/uL


(4.0-11.0) 





 


Red Blood Count


 


 4.41 x10^6/uL


(3.50-5.40) 





 


Hemoglobin


 


 12.4 g/dL


(12.0-15.5) 





 


Hematocrit


 


 36.9 %


(36.0-47.0) 





 


Mean Corpuscular Volume  84 fL ()  


 


Mean Corpuscular Hemoglobin  28 pg (25-35)  


 


Mean Corpuscular Hemoglobin


Concent 


 34 g/dL


(31-37) 





 


Red Cell Distribution Width


 


 14.8 %


(11.5-14.5) 





 


Platelet Count


 


 435 x10^3/uL


(140-400) 





 


Neutrophils (%) (Auto)  92 % (31-73)  


 


Lymphocytes (%) (Auto)  3 % (24-48)  


 


Monocytes (%) (Auto)  4 % (0-9)  


 


Eosinophils (%) (Auto)  0 % (0-3)  


 


Basophils (%) (Auto)  0 % (0-3)  


 


Neutrophils # (Auto)


 


 24.1 x10^3/uL


(1.8-7.7) 





 


Lymphocytes # (Auto)


 


 0.8 x10^3/uL


(1.0-4.8) 





 


Monocytes # (Auto)


 


 1.0 x10^3/uL


(0.0-1.1) 





 


Eosinophils # (Auto)


 


 0.0 x10^3/uL


(0.0-0.7) 





 


Basophils # (Auto)


 


 0.1 x10^3/uL


(0.0-0.2) 





 


Segmented Neutrophils %  88 % (35-66)  


 


Band Neutrophils %  4 % (0-9)  


 


Lymphocytes %  3 % (24-48)  


 


Monocytes %  5 % (0-10)  


 


Platelet Estimate


 


 Increased


(ADEQUATE) 





 


Sodium Level


 


 130 mmol/L


(136-145) 





 


Potassium Level


 


 4.9 mmol/L


(3.5-5.1) 





 


Chloride Level


 


 97 mmol/L


() 





 


Carbon Dioxide Level


 


 21 mmol/L


(21-32) 





 


Anion Gap  12 (6-14)  


 


Blood Urea Nitrogen


 


 37 mg/dL


(7-20) 





 


Creatinine


 


 1.6 mg/dL


(0.6-1.0) 





 


Estimated GFR


(Cockcroft-Gault) 


 31.8 


 





 


BUN/Creatinine Ratio  23 (6-20)  


 


Glucose Level


 


 393 mg/dL


(70-99) 





 


Calcium Level


 


 9.9 mg/dL


(8.5-10.1) 





 


Total Bilirubin


 


 0.4 mg/dL


(0.2-1.0) 





 


Aspartate Amino Transf


(AST/SGOT) 


 10 U/L (15-37) 


 





 


Alanine Aminotransferase


(ALT/SGPT) 


 16 U/L (14-59) 


 





 


Alkaline Phosphatase


 


 97 U/L


() 





 


Total Protein


 


 7.8 g/dL


(6.4-8.2) 





 


Albumin


 


 3.1 g/dL


(3.4-5.0) 





 


Albumin/Globulin Ratio  0.7 (1.0-1.7)  


 


Lactic Acid Level


 


 


 2.7 mmol/L


(0.4-2.0)











Images:


Images


PROCEDURE: CT ABDOMEN PELVIS WO CONTRAST





CT ABDOMEN+PELVIS WO





History: Left lower quadrant pain.





Comparison: CT abdomen and pelvis 6/23/2018





Technique: Noncontrast CT of the abdomen and pelvis. 





Findings:


The lung bases are clear. There are coronary artery calcifications partially 

visualized.





The liver, gallbladder, pancreas and adrenal glands are unremarkable. There is 

punctate calcifications throughout the spleen and a few in the liver which are 

compatible with old granulomatous disease. The right kidney is unremarkable. The

left kidney demonstrates minimal hydronephrosis and edema with perinephric 

adipose stranding. No nephrolithiasis. No hydroureter. No ureterolithiasis 

identified.





Small sliding hiatal hernia versus patulous distal esophagus. The small bowel is

unremarkable. Mild colonic diverticulosis. No evidence of diverticulitis.





The uterus and adnexa are within normal limits. The bladder is unremarkable. 

There is no intra-abdominal free fluid or free air. Mild atherosclerotic 

calcification of the aorta and iliac arteries; no aneurysm. No abdominopelvic 

adenopathy.





Soft tissues are unremarkable. There are multilevel degenerative changes in the 

spine with moderate degenerative anterolisthesis of L5 on S1. Severe left hip 

osteoarthritis. Right total hip arthroplasty changes.





Impression: 





1.  Left kidney edema with mild hydronephrosis and left perinephric fat 

stranding. Findings may relate to pyelonephritis. No radiopaque urolithiasis 

identified.


2.  Colonic diverticulosis without evidence of diverticulitis.


3.  Degenerative changes of the spine and left hip.





Assessment/Plan


Assessment/Plan


Sepsis


Acute pyelonephritis


Thrombocytosis related to infectious etiology


Acute electrolyte derangementhyponatremia, hypochloremia due to volume 

depletion


Acute on chronic kidney injury due to vasomotor nephropathy, baseline creatinine

appears to be 1.3 in March 2022


Lactic acidemia


History of diabetes mellitus type 2


History of dyslipidemia


History of atrial fibrillation, not on any blood thinners or rate control 

medications


History of hypertension





Admit to hospitalist service for further management


Continue IV fluids


Continue IV antibiotics


Pending urine cultures


R ISS and Accu-Cheks


Continue telemetry monitoring


Heparin for DVT prophylaxis


ADA diet


CODE STATUS full


Discussed with RN and SW


Disposition inpatient management as above


DPOA: Sister





Justifications for Admission


Other Justification














TANVIR FORREST MD                  May 29, 2022 15:26

## 2022-05-29 NOTE — RAD
CT ABDOMEN+PELVIS WO



History: Left lower quadrant pain.



Comparison: CT abdomen and pelvis 6/23/2018



Technique: Noncontrast CT of the abdomen and pelvis. 



Findings:

The lung bases are clear. There are coronary artery calcifications partially visualized.



The liver, gallbladder, pancreas and adrenal glands are unremarkable. There is punctate calcification
s throughout the spleen and a few in the liver which are compatible with old granulomatous disease. T
he right kidney is unremarkable. The left kidney demonstrates minimal hydronephrosis and edema with p
erinephric adipose stranding. No nephrolithiasis. No hydroureter. No ureterolithiasis identified.



Small sliding hiatal hernia versus patulous distal esophagus. The small bowel is unremarkable. Mild c
olonic diverticulosis. No evidence of diverticulitis.



The uterus and adnexa are within normal limits. The bladder is unremarkable. There is no intra-abdomi
nal free fluid or free air. Mild atherosclerotic calcification of the aorta and iliac arteries; no an
eurysm. No abdominopelvic adenopathy.



Soft tissues are unremarkable. There are multilevel degenerative changes in the spine with moderate d
egenerative anterolisthesis of L5 on S1. Severe left hip osteoarthritis. Right total hip arthroplasty
 changes.



Impression: 



1.  Left kidney edema with mild hydronephrosis and left perinephric fat stranding. Findings may relat
e to pyelonephritis. No radiopaque urolithiasis identified.

2.  Colonic diverticulosis without evidence of diverticulitis.

3.  Degenerative changes of the spine and left hip.





------

Exposure: One or more of the following individualized dose reduction techniques were utilized for thi
s examination:  

1. Automated exposure control

2. Adjustment of the mA and/or kV according to patient size

3. Use of iterative reconstruction technique.



Electronically signed by: Cristian Denson MD (5/29/2022 3:07 PM) [id number]

## 2022-05-29 NOTE — PHYS DOC
Past Medical History


Past Medical History:  A-Fib, Diabetes-Type II, High Cholesterol, Hypertension, 

Other


Additional Past Medical Histor:  RECTAL BLEEDING


Past Surgical History:  Other


Additional Past Surgical Histo:  cardiac cath in )hip replacement.


Smoking Status:  Never Smoker


Alcohol Use:  None


Drug Use:  None





General Adult


EDM:


Chief Complaint:  GI PROBLEM





HPI:


HPI:


History obtained from patient.  Patient is a 72-year-old female with past 

medical history significant for non-insulin-dependent diabetes, hypertension who

presents with chief complaint of urinary urgency and left lower quadrant 

discomfort with urination.  Notes 2 small bowel movements today.  States they 

were nonbloody.  No she is passing flatus.  Notes some mild nausea but denies 

vomiting.  Denies fevers.  Denies history of UTIs.  Notes history of normal 

colonoscopy.  Denies previous abdominal surgical history.  Has been able to 

tolerate p.o. today.  Denies vaginal bleeding or discharge.  Denies hematuria.  

Denies any low back pain.





Review of Systems:


Review of Systems:


Constitutional:   Denies fever or chills. []


Eyes:   Denies change in visual acuity. []


HENT:   Denies nasal congestion or sore throat. [] 


Respiratory:   Denies cough or shortness of breath. [] 


Cardiovascular:   Denies chest pain or edema. [] 


GI:   Denies abdominal pain, nausea, vomiting, bloody stools or diarrhea. [] 


: Dysuria, urinary urgency


Musculoskeletal:   Denies back pain or joint pain. [] 


Integument:   Denies rash. [] 


Neurologic:   Denies headache, focal weakness or sensory changes. [] 


Endocrine:   Denies polyuria or polydipsia. [] 


Lymphatic:  Denies swollen glands. [] 


Psychiatric:  Denies depression or anxiety. []





Heart Score:


C/O Chest Pain:  No


Risk Factors:


Risk Factors:  DM, Current or recent (<one month) smoker, HTN, HLP, family 

history of CAD, obesity.


Risk Scores:


Score 0 - 3:  2.5% MACE over next 6 weeks - Discharge Home


Score 4 - 6:  20.3% MACE over next 6 weeks - Admit for Clinical Observation


Score 7 - 10:  72.7% MACE over next 6 weeks - Early Invasive Strategies





Allergies:


Allergies:





Allergies








Coded Allergies Type Severity Reaction Last Updated Verified


 


  atorvastatin Allergy Intermediate  18 Yes


 


  enoxaparin Adverse Reaction Severe  18 Yes


 


  warfarin Adverse Reaction Severe  18 Yes


 


  adhesive tape Adverse Reaction Intermediate Rash 18 Yes


 


  diphenhydramine Adverse Reaction Intermediate LIGHTHEADED 14 No











Physical Exam:


PE:





Constitutional: Well developed, well nourished, no acute distress, non-toxic 

appearance. []


HENT: Normocephalic, atraumatic, bilateral external ears normal, oropharynx 

moist, no oral exudates, nose normal. []


Eyes: PERRLA, EOMI, conjunctiva normal, no discharge. [] 


Neck: Normal range of motion, no tenderness, supple, no stridor. [] 


Cardiovascular:Heart rate regular rhythm, no murmur []


Lungs & Thorax:  Bilateral breath sounds clear to auscultation []


Abdomen: soft, nontender, nonacute abdomen.  No involuntary guarding or rigidity

 noted.  No acute peritonitis.


Skin: Warm, dry, no erythema, no rash. [] 


Back: No tenderness, no CVA tenderness. [] 


Extremities: No tenderness, no cyanosis, no clubbing, ROM intact, no edema. [] 


Neurologic: Alert and oriented X 3, normal motor function, normal sensory 

function, no focal deficits noted. []


Psychologic: Affect normal, judgement normal, mood normal. []





Current Patient Data:


Labs:





Laboratory Tests








Test


 22


11:55 22


12:41 22


14:39


 


Urine Collection Type Unknown   


 


Urine Color (Auto) Light yellow   


 


Urine Turbidity Hazy   


 


Urine pH (Auto) 5.5   


 


Urine Specific Gravity 1.017   


 


Urine Protein (Auto) Negative mg/dL   


 


Urine Glucose (Auto)(UA) >=1000 mg/dL   


 


Urine Ketones (Auto) Negative mg/dL   


 


Urine Blood (Auto) Trace   


 


Urine Nitrite Negative   


 


Urine Bilirubin (Auto) Negative   


 


Urine Urobilinogen (Auto) Normal mg/dL   


 


Urine Leukocyte Esterase


(Auto) Large 


 


 





 


Urine RBC 0 /HPF   


 


Urine WBC Tntc /HPF   


 


Urine Bacteria Many /HPF   


 


White Blood Count  26.1 x10^3/uL  


 


Red Blood Count  4.41 x10^6/uL  


 


Hemoglobin  12.4 g/dL  


 


Hematocrit  36.9 %  


 


Mean Corpuscular Volume  84 fL  


 


Mean Corpuscular Hemoglobin  28 pg  


 


Mean Corpuscular Hemoglobin


Concent 


 34 g/dL 


 





 


Red Cell Distribution Width  14.8 %  


 


Platelet Count  435 x10^3/uL  


 


Neutrophils (%) (Auto)  92 %  


 


Lymphocytes (%) (Auto)  3 %  


 


Monocytes (%) (Auto)  4 %  


 


Eosinophils (%) (Auto)  0 %  


 


Basophils (%) (Auto)  0 %  


 


Neutrophils # (Auto)  24.1 x10^3/uL  


 


Lymphocytes # (Auto)  0.8 x10^3/uL  


 


Monocytes # (Auto)  1.0 x10^3/uL  


 


Eosinophils # (Auto)  0.0 x10^3/uL  


 


Basophils # (Auto)  0.1 x10^3/uL  


 


Segmented Neutrophils %  88 %  


 


Band Neutrophils %  4 %  


 


Lymphocytes %  3 %  


 


Monocytes %  5 %  


 


Platelet Estimate  Increased  


 


Sodium Level  130 mmol/L  


 


Potassium Level  4.9 mmol/L  


 


Chloride Level  97 mmol/L  


 


Carbon Dioxide Level  21 mmol/L  


 


Anion Gap  12  


 


Blood Urea Nitrogen  37 mg/dL  


 


Creatinine  1.6 mg/dL  


 


Estimated GFR


(Cockcroft-Gault) 


 31.8 


 





 


BUN/Creatinine Ratio  23  


 


Glucose Level  393 mg/dL  


 


Calcium Level  9.9 mg/dL  


 


Total Bilirubin  0.4 mg/dL  


 


Aspartate Amino Transf


(AST/SGOT) 


 10 U/L 


 





 


Alanine Aminotransferase


(ALT/SGPT) 


 16 U/L 


 





 


Alkaline Phosphatase  97 U/L  


 


Total Protein  7.8 g/dL  


 


Albumin  3.1 g/dL  


 


Albumin/Globulin Ratio  0.7  


 


Lactic Acid Level   2.7 mmol/L 








Current Medications








 Medications


  (Trade)  Dose


 Ordered  Sig/Hawa


 Route


 PRN Reason  Start Time


 Stop Time Status Last Admin


Dose Admin


 


 Cephalexin HCl


  (Keflex)  500 mg  1X  ONCE


 PO


   22 13:15


 22 13:16 DC 22 13:33





 


 Ondansetron HCl


  (Zofran Odt)  4 mg  1X  ONCE


 PO


   22 13:15


 22 13:16 DC 22 13:33





 


 Sodium Chloride  1,000 ml @ 


 0 mls/hr  1X  ONCE


 IV


   22 14:00


 22 13:53 DC  





 


 Sodium Chloride  1,000 ml @ 


 1,000 mls/hr  1X  ONCE


 IV


   22 14:00


 22 14:59 DC 22 13:54





 


 Ceftriaxone Sodium


  (Rocephin)  1 gm  1X  ONCE


 IVP


   22 14:00


 22 14:02 DC 22 14:41











                                Laboratory Tests








Test


 22


11:55


 


Urine Collection Type Unknown  


 


Urine Color (Auto) Light yellow  


 


Urine Turbidity Hazy  


 


Urine pH (Auto)


 5.5 (<5.0-8.0)





 


Urine Specific Gravity


 1.017


(1.000-1.030)


 


Urine Protein (Auto)


 Negative mg/dL


(Negative)


 


Urine Glucose (Auto)(UA)


 >=1000 mg/dL


(Negative)


 


Urine Ketones (Auto)


 Negative mg/dL


(Negative)


 


Urine Blood (Auto)


 Trace


(Negative)


 


Urine Nitrite


 Negative


(Negative)


 


Urine Bilirubin (Auto)


 Negative


(Negative)


 


Urine Urobilinogen (Auto)


 Normal mg/dL


(Normal)


 


Urine Leukocyte Esterase


(Auto) Large


(Negative)


 


Urine RBC 0 /HPF (0-2)  


 


Urine WBC


 Tntc /HPF


(0-4)


 


Urine Bacteria


 Many /HPF


(0-FEW)








Vital Signs:





                                   Vital Signs








  Date Time  Temp Pulse Resp B/P (MAP) Pulse Ox O2 Delivery O2 Flow Rate FiO2


 


22 11:40 99.0 93 18 144/75 (98) 98 Room Air  





 99.0       











EKG:


EKG:


[]





Radiology/Procedures:


Radiology/Procedures:


[facility]


                    [address] [place] [address]


                                 [phone]


                                        


                                 IMAGING REPORT





                                     Signed





PATIENT: JOSE WHEELER JACCOUNT: [id number]     MRN#: [id number]


: 1950           LOCATION: ER              AGE: 72


SEX: F                    EXAM DT: 22         ACCESSION#: [id number]


STATUS: REG ER            ORD. PHYSICIAN: SARA QUEZADA DO


REASON: LLQ pain


PROCEDURE: CT ABDOMEN PELVIS WO CONTRAST





CT ABDOMEN+PELVIS WO





History: Left lower quadrant pain.





Comparison: CT abdomen and pelvis 2018





Technique: Noncontrast CT of the abdomen and pelvis. 





Findings:


The lung bases are clear. There are coronary artery calcifications partially 

visualized.





The liver, gallbladder, pancreas and adrenal glands are unremarkable. There is 

punctate calcifications throughout the spleen and a few in the liver which are 

compatible with old granulomatous disease. The right kidney is unremarkable. The

 left kidney demonstrates minimal hydronephrosis and edema with perinephric 

adipose stranding. No nephrolithiasis. No hydroureter. No ureterolithiasis 

identified.





Small sliding hiatal hernia versus patulous distal esophagus. The small bowel is

 unremarkable. Mild colonic diverticulosis. No evidence of diverticulitis.





The uterus and adnexa are within normal limits. The bladder is unremarkable. 

There is no intra-abdominal free fluid or free air. Mild atherosclerotic 

calcification of the aorta and iliac arteries; no aneurysm. No abdominopelvic 

adenopathy.





Soft tissues are unremarkable. There are multilevel degenerative changes in the 

spine with moderate degenerative anterolisthesis of L5 on S1. Severe left hip 

osteoarthritis. Right total hip arthroplasty changes.





Impression: 





1.  Left kidney edema with mild hydronephrosis and left perinephric fat 

stranding. Findings may relate to pyelonephritis. No radiopaque urolithiasis lorne

ntified.


2.  Colonic diverticulosis without evidence of diverticulitis.


3.  Degenerative changes of the spine and left hip.








------


Exposure: One or more of the following individualized dose reduction techniques 

were utilized for this examination:  


1. Automated exposure control


2. Adjustment of the mA and/or kV according to patient size


3. Use of iterative reconstruction technique.





Electronically signed by: Cristian Alamo MD (2022 3:07 PM) [id number]














DICTATED and SIGNED BY:     CRISTIAN ALAMO MD


DATE:     22


[]





Course & Med Decision Making:


Course & Med Decision Making


Pertinent Labs and Imaging studies reviewed. (See chart for details)





[] Patient is a very pleasant 72-year-old female presents with chief complaint 

of urinary urgency as well as left lower quadrant and left lower back pain.  

Initial vital signs unremarkable.  Broad work-up obtained.  Urinalysis does 

suggest infection.  Urine culture pending.  Initially oral Keflex was 

administered as I felt the patient was likely an appropriate candidate for 

outpatient treatment.  However blood work did return with a significant 

leukocytosis.  Hyponatremia hypochloremia present.  CKD with hyperglycemia noted

.  No signs of DKA.  IV Rocephin given additionally.  Blood cultures pending.  

Lactate elevated at 2.7.  Fluids will be administered overall I do feel the 

patient would benefit from hospitalization.  Could have sepsis related to 

pyelonephritis.  Case discussed with hospitalist who agrees with plan.  No 

indication for pressor support at this time.





The patient had a high probability of sudden, clinically significant 

deterioration, which required the highest level of physician preparedness to 

intervene urgently.  I managed life or organ supporting interventions that 

required frequent re-assessment throughout their stay in the emergency 

department.  The total critical care time spent managing their case, separate 

from other billable procedures, was 31 minutes.





Dragon Disclaimer:


Dragon Disclaimer:


This electronic medical record was generated, in whole or in part, using a voice

 recognition dictation system.





Departure


Departure


Impression:  


   Primary Impression:  


   Sepsis


   Qualified Codes:  A41.9 - Sepsis, unspecified organism


   Additional Impressions:  


   Hyponatremia


   CKD (chronic kidney disease)


   Qualified Codes:  N18.9 - Chronic kidney disease, unspecified


   Hyperglycemia


   Leukocytosis


   Qualified Codes:  D72.829 - Elevated white blood cell count, unspecified


   Pyelonephritis


Disposition:   ADMITTED AS INPATIENT


Condition:  GOOD


Referrals:  


ANIRUDH HUERTAS MD (PCP)


Patient Instructions:  Urinary Tract Infection


Scripts


Ondansetron (ONDANSETRON ODT) 4 Mg Tab.rapdis


1 TAB PO PRN Q6-8HRS, #9 TAB


   Prov: SARA QUEZADA DO         22 


Cephalexin (KEFLEX) 500 Mg Capsule


1 CAP PO BID for 7 Days, #14 CAP


   Prov: SARA QUEZADA DO         22











SARA QUEZADA DO          May 29, 2022 13:12

## 2022-05-30 VITALS — SYSTOLIC BLOOD PRESSURE: 126 MMHG | DIASTOLIC BLOOD PRESSURE: 64 MMHG

## 2022-05-30 VITALS — DIASTOLIC BLOOD PRESSURE: 64 MMHG | SYSTOLIC BLOOD PRESSURE: 136 MMHG

## 2022-05-30 VITALS — SYSTOLIC BLOOD PRESSURE: 125 MMHG | DIASTOLIC BLOOD PRESSURE: 49 MMHG

## 2022-05-30 VITALS — SYSTOLIC BLOOD PRESSURE: 147 MMHG | DIASTOLIC BLOOD PRESSURE: 63 MMHG

## 2022-05-30 VITALS — SYSTOLIC BLOOD PRESSURE: 113 MMHG | DIASTOLIC BLOOD PRESSURE: 61 MMHG

## 2022-05-30 VITALS — SYSTOLIC BLOOD PRESSURE: 152 MMHG | DIASTOLIC BLOOD PRESSURE: 95 MMHG

## 2022-05-30 LAB
ANION GAP SERPL CALC-SCNC: 14 MMOL/L (ref 6–14)
BASOPHILS # BLD AUTO: 0 X10^3/UL (ref 0–0.2)
BASOPHILS NFR BLD: 0 % (ref 0–3)
BUN SERPL-MCNC: 32 MG/DL (ref 7–20)
CALCIUM SERPL-MCNC: 8.5 MG/DL (ref 8.5–10.1)
CHLORIDE SERPL-SCNC: 97 MMOL/L (ref 98–107)
CO2 SERPL-SCNC: 19 MMOL/L (ref 21–32)
CREAT SERPL-MCNC: 1.7 MG/DL (ref 0.6–1)
EOSINOPHIL NFR BLD: 0 % (ref 0–3)
EOSINOPHIL NFR BLD: 0 X10^3/UL (ref 0–0.7)
ERYTHROCYTE [DISTWIDTH] IN BLOOD BY AUTOMATED COUNT: 15 % (ref 11.5–14.5)
GFR SERPLBLD BASED ON 1.73 SQ M-ARVRAT: 29.6 ML/MIN
GLUCOSE SERPL-MCNC: 299 MG/DL (ref 70–99)
HCT VFR BLD CALC: 31.3 % (ref 36–47)
HGB BLD-MCNC: 10.4 G/DL (ref 12–15.5)
LYMPHOCYTES # BLD: 0.8 X10^3/UL (ref 1–4.8)
LYMPHOCYTES NFR BLD AUTO: 3 % (ref 24–48)
MAGNESIUM SERPL-MCNC: 1.5 MG/DL (ref 1.8–2.4)
MCH RBC QN AUTO: 28 PG (ref 25–35)
MCHC RBC AUTO-ENTMCNC: 33 G/DL (ref 31–37)
MCV RBC AUTO: 85 FL (ref 79–100)
MONO #: 1 X10^3/UL (ref 0–1.1)
MONOCYTES NFR BLD: 3 % (ref 0–9)
NEUT #: 30.6 X10^3/UL (ref 1.8–7.7)
NEUTROPHILS NFR BLD AUTO: 94 % (ref 31–73)
PHOSPHATE SERPL-MCNC: 2.7 MG/DL (ref 2.6–4.7)
PLATELET # BLD AUTO: 361 X10^3/UL (ref 140–400)
POTASSIUM SERPL-SCNC: 4.2 MMOL/L (ref 3.5–5.1)
RBC # BLD AUTO: 3.68 X10^6/UL (ref 3.5–5.4)
SODIUM SERPL-SCNC: 130 MMOL/L (ref 136–145)
WBC # BLD AUTO: 32.5 X10^3/UL (ref 4–11)

## 2022-05-30 RX ADMIN — ONDANSETRON PRN MG: 2 INJECTION INTRAMUSCULAR; INTRAVENOUS at 19:58

## 2022-05-30 RX ADMIN — BACITRACIN SCH MLS/HR: 5000 INJECTION, POWDER, FOR SOLUTION INTRAMUSCULAR at 03:01

## 2022-05-30 RX ADMIN — Medication SCH CAP: at 19:58

## 2022-05-30 RX ADMIN — INSULIN LISPRO SCH UNITS: 100 INJECTION, SOLUTION INTRAVENOUS; SUBCUTANEOUS at 08:12

## 2022-05-30 RX ADMIN — INSULIN LISPRO SCH UNITS: 100 INJECTION, SOLUTION INTRAVENOUS; SUBCUTANEOUS at 17:14

## 2022-05-30 RX ADMIN — Medication SCH CAP: at 19:52

## 2022-05-30 RX ADMIN — HEPARIN SODIUM SCH UNIT: 5000 INJECTION, SOLUTION INTRAVENOUS; SUBCUTANEOUS at 14:45

## 2022-05-30 RX ADMIN — ONDANSETRON PRN MG: 2 INJECTION INTRAMUSCULAR; INTRAVENOUS at 03:01

## 2022-05-30 RX ADMIN — HEPARIN SODIUM SCH UNIT: 5000 INJECTION, SOLUTION INTRAVENOUS; SUBCUTANEOUS at 07:10

## 2022-05-30 RX ADMIN — CEFTRIAXONE SCH GM: 1 INJECTION, POWDER, FOR SOLUTION INTRAMUSCULAR; INTRAVENOUS at 14:40

## 2022-05-30 RX ADMIN — INSULIN LISPRO SCH UNITS: 100 INJECTION, SOLUTION INTRAVENOUS; SUBCUTANEOUS at 12:07

## 2022-05-30 RX ADMIN — HEPARIN SODIUM SCH UNIT: 5000 INJECTION, SOLUTION INTRAVENOUS; SUBCUTANEOUS at 19:52

## 2022-05-30 RX ADMIN — BACITRACIN SCH MLS/HR: 5000 INJECTION, POWDER, FOR SOLUTION INTRAMUSCULAR at 12:42

## 2022-05-30 RX ADMIN — BACITRACIN SCH MLS/HR: 5000 INJECTION, POWDER, FOR SOLUTION INTRAMUSCULAR at 19:53

## 2022-05-30 NOTE — RAD
Two-view left knee



HISTORY: Pain



Limited two-view AP lateral views



There is loss of the patellofemoral joint space with marginal spurring. There is mild marginal spurri
ng of the medial and lateral compartments. There is no lytic destructive changes.



IMPRESSION:



Osteoarthrosis. No acute findings.



Electronically signed by: Joe Barker III, MD (5/30/2022 12:26 AM) [facility] JESUS

## 2022-05-30 NOTE — PDOC
[employer] PROGRESS NOTE


Date of Service


DOS:


DATE: 5/30/22 


TIME: 17:22





Chief Complaint


Chief Complaint


Assessment/Plan


Sepsis


Acute pyelonephritis


Thrombocytosis related to infectious etiology


Acute electrolyte derangementhyponatremia, hypochloremia due to volume 

depletion


Acute on chronic kidney injury due to vasomotor nephropathy, baseline creatinine

appears to be 1.3 in March 2022


Lactic acidemia


History of diabetes mellitus type 2


History of dyslipidemia


History of atrial fibrillation, not on any blood thinners or rate control 

medications


History of hypertension





Admit to hospitalist service for further management


Continue IV fluids


Continue IV antibiotics


Pending urine cultures


R ISS and Accu-Cheks


Continue telemetry monitoring


Heparin for DVT prophylaxis


ADA diet


CODE STATUS full


Discussed with RN and SW


Disposition inpatient management as above


DPOA: Sister





History of Present Illness


History of Present Illness


5/30


Evaluate examined at bedside.  Resting in bed.  Continue IV antibiotics.  

Electrolytes improving.  Monitor renal function.  Discussed with bedside RN.  

Otherwise continue current.





Vitals/I&O


Vitals/I&O:





                                   Vital Signs








  Date Time  Temp Pulse Resp B/P (MAP) Pulse Ox O2 Delivery O2 Flow Rate FiO2


 


5/30/22 15:00 98.2 84 17 125/49 (74) 92 Room Air  





 98.2       


 


5/30/22 08:00       94.0 














                                    I & O   


 


 5/29/22 5/29/22 5/30/22





 15:00 23:00 07:00


 


Intake Total  1920 ml 


 


Output Total  0 ml 300 ml


 


Balance  1920 ml -300 ml











Physical Exam


General:  Alert, Oriented X3, Cooperative


Heart:  Regular rate


Lungs:  Clear


Abdomen:  Normal bowel sounds, Soft, No tenderness


Extremities:  No edema, Normal pulses


Skin:  No significant lesion





Labs


Labs:





Laboratory Tests








Test


 5/29/22


17:50 5/30/22


04:20 5/30/22


07:37 5/30/22


11:46


 


Lactic Acid Level


 3.0 mmol/L


(0.4-2.0) 


 


 





 


White Blood Count


 


 32.5 x10^3/uL


(4.0-11.0) 


 





 


Red Blood Count


 


 3.68 x10^6/uL


(3.50-5.40) 


 





 


Hemoglobin


 


 10.4 g/dL


(12.0-15.5) 


 





 


Hematocrit


 


 31.3 %


(36.0-47.0) 


 





 


Mean Corpuscular Volume  85 fL ()   


 


Mean Corpuscular Hemoglobin  28 pg (25-35)   


 


Mean Corpuscular Hemoglobin


Concent 


 33 g/dL


(31-37) 


 





 


Red Cell Distribution Width


 


 15.0 %


(11.5-14.5) 


 





 


Platelet Count


 


 361 x10^3/uL


(140-400) 


 





 


Neutrophils (%) (Auto)  94 % (31-73)   


 


Lymphocytes (%) (Auto)  3 % (24-48)   


 


Monocytes (%) (Auto)  3 % (0-9)   


 


Eosinophils (%) (Auto)  0 % (0-3)   


 


Basophils (%) (Auto)  0 % (0-3)   


 


Neutrophils # (Auto)


 


 30.6 x10^3/uL


(1.8-7.7) 


 





 


Lymphocytes # (Auto)


 


 0.8 x10^3/uL


(1.0-4.8) 


 





 


Monocytes # (Auto)


 


 1.0 x10^3/uL


(0.0-1.1) 


 





 


Eosinophils # (Auto)


 


 0.0 x10^3/uL


(0.0-0.7) 


 





 


Basophils # (Auto)


 


 0.0 x10^3/uL


(0.0-0.2) 


 





 


Sodium Level


 


 130 mmol/L


(136-145) 


 





 


Potassium Level


 


 4.2 mmol/L


(3.5-5.1) 


 





 


Chloride Level


 


 97 mmol/L


() 


 





 


Carbon Dioxide Level


 


 19 mmol/L


(21-32) 


 





 


Anion Gap  14 (6-14)   


 


Blood Urea Nitrogen


 


 32 mg/dL


(7-20) 


 





 


Creatinine


 


 1.7 mg/dL


(0.6-1.0) 


 





 


Estimated GFR


(Cockcroft-Gault) 


 29.6 


 


 





 


Glucose Level


 


 299 mg/dL


(70-99) 


 





 


Calcium Level


 


 8.5 mg/dL


(8.5-10.1) 


 





 


Phosphorus Level


 


 2.7 mg/dL


(2.6-4.7) 


 





 


Magnesium Level


 


 1.5 mg/dL


(1.8-2.4) 


 





 


Glucose (Fingerstick)


 


 


 283 mg/dL


(70-99) 250 mg/dL


(70-99)


 


Test


 5/30/22


16:50 


 


 





 


Glucose (Fingerstick)


 295 mg/dL


(70-99) 


 


 














Assessment and Plan


Assessmemt and Plan


Problems


Medical Problems:


(1) CKD (chronic kidney disease)


Status: Acute  





(2) Hyperglycemia


Status: Acute  





(3) Hyponatremia


Status: Acute  





(4) Leukocytosis


Status: Acute  





(5) Pyelonephritis


Status: Acute  





(6) Sepsis


Status: Acute  





(7) UTI (urinary tract infection)


Status: Acute  











Comment


Review of Relevant


I have reviewed the following items anu (where applicable) has been applied.


Medications:





Current Medications








 Medications


  (Trade)  Dose


 Ordered  Sig/Hawa


 Route


 PRN Reason  Start Time


 Stop Time Status Last Admin


Dose Admin


 


 Ceftriaxone Sodium


  (Rocephin)  1 gm  Q24H


 IVP


   5/30/22 15:00


    5/30/22 14:40





 


 Heparin Sodium


  (Porcine)


  (Heparin Sodium)  5,000 unit  Q8HRS


 SQ


   5/29/22 22:00


    5/30/22 14:45














Justifications for Admission


Other Justification


Acute pyelonephritis











MONICA REN MD         May 30, 2022 17:23

## 2022-05-31 VITALS — SYSTOLIC BLOOD PRESSURE: 126 MMHG | DIASTOLIC BLOOD PRESSURE: 67 MMHG

## 2022-05-31 VITALS — SYSTOLIC BLOOD PRESSURE: 142 MMHG | DIASTOLIC BLOOD PRESSURE: 63 MMHG

## 2022-05-31 VITALS — SYSTOLIC BLOOD PRESSURE: 157 MMHG | DIASTOLIC BLOOD PRESSURE: 66 MMHG

## 2022-05-31 VITALS — SYSTOLIC BLOOD PRESSURE: 150 MMHG | DIASTOLIC BLOOD PRESSURE: 66 MMHG

## 2022-05-31 VITALS — DIASTOLIC BLOOD PRESSURE: 59 MMHG | SYSTOLIC BLOOD PRESSURE: 155 MMHG

## 2022-05-31 LAB
ANION GAP SERPL CALC-SCNC: 13 MMOL/L (ref 6–14)
BASOPHILS # BLD AUTO: 0 X10^3/UL (ref 0–0.2)
BASOPHILS NFR BLD: 0 % (ref 0–3)
BUN SERPL-MCNC: 26 MG/DL (ref 7–20)
CALCIUM SERPL-MCNC: 8.4 MG/DL (ref 8.5–10.1)
CHLORIDE SERPL-SCNC: 97 MMOL/L (ref 98–107)
CO2 SERPL-SCNC: 20 MMOL/L (ref 21–32)
CREAT SERPL-MCNC: 1.8 MG/DL (ref 0.6–1)
EOSINOPHIL NFR BLD: 0 % (ref 0–3)
EOSINOPHIL NFR BLD: 0 X10^3/UL (ref 0–0.7)
ERYTHROCYTE [DISTWIDTH] IN BLOOD BY AUTOMATED COUNT: 14.8 % (ref 11.5–14.5)
GFR SERPLBLD BASED ON 1.73 SQ M-ARVRAT: 27.7 ML/MIN
GLUCOSE SERPL-MCNC: 281 MG/DL (ref 70–99)
HBA1C MFR BLD: 13.5 % (ref 4.8–5.6)
HCT VFR BLD CALC: 30.5 % (ref 36–47)
HGB BLD-MCNC: 10.2 G/DL (ref 12–15.5)
LYMPHOCYTES # BLD: 0.8 X10^3/UL (ref 1–4.8)
LYMPHOCYTES NFR BLD AUTO: 3 % (ref 24–48)
MAGNESIUM SERPL-MCNC: 1.5 MG/DL (ref 1.8–2.4)
MCH RBC QN AUTO: 28 PG (ref 25–35)
MCHC RBC AUTO-ENTMCNC: 33 G/DL (ref 31–37)
MCV RBC AUTO: 84 FL (ref 79–100)
MONO #: 1.1 X10^3/UL (ref 0–1.1)
MONOCYTES NFR BLD: 5 % (ref 0–9)
NEUT #: 22.3 X10^3/UL (ref 1.8–7.7)
NEUTROPHILS NFR BLD AUTO: 92 % (ref 31–73)
PLATELET # BLD AUTO: 316 X10^3/UL (ref 140–400)
POTASSIUM SERPL-SCNC: 4.1 MMOL/L (ref 3.5–5.1)
RBC # BLD AUTO: 3.65 X10^6/UL (ref 3.5–5.4)
SODIUM SERPL-SCNC: 130 MMOL/L (ref 136–145)
WBC # BLD AUTO: 24.2 X10^3/UL (ref 4–11)

## 2022-05-31 RX ADMIN — HEPARIN SODIUM SCH UNIT: 5000 INJECTION, SOLUTION INTRAVENOUS; SUBCUTANEOUS at 06:10

## 2022-05-31 RX ADMIN — Medication SCH CAP: at 20:01

## 2022-05-31 RX ADMIN — INSULIN LISPRO SCH UNITS: 100 INJECTION, SOLUTION INTRAVENOUS; SUBCUTANEOUS at 17:35

## 2022-05-31 RX ADMIN — INSULIN LISPRO SCH UNITS: 100 INJECTION, SOLUTION INTRAVENOUS; SUBCUTANEOUS at 08:04

## 2022-05-31 RX ADMIN — CEFTRIAXONE SCH GM: 1 INJECTION, POWDER, FOR SOLUTION INTRAMUSCULAR; INTRAVENOUS at 14:15

## 2022-05-31 RX ADMIN — BACITRACIN SCH MLS/HR: 5000 INJECTION, POWDER, FOR SOLUTION INTRAMUSCULAR at 08:27

## 2022-05-31 RX ADMIN — INSULIN LISPRO SCH UNITS: 100 INJECTION, SOLUTION INTRAVENOUS; SUBCUTANEOUS at 12:17

## 2022-05-31 RX ADMIN — HEPARIN SODIUM SCH UNIT: 5000 INJECTION, SOLUTION INTRAVENOUS; SUBCUTANEOUS at 14:19

## 2022-05-31 RX ADMIN — Medication SCH CAP: at 08:29

## 2022-05-31 RX ADMIN — HEPARIN SODIUM SCH UNIT: 5000 INJECTION, SOLUTION INTRAVENOUS; SUBCUTANEOUS at 20:02

## 2022-05-31 RX ADMIN — BACITRACIN SCH MLS/HR: 5000 INJECTION, POWDER, FOR SOLUTION INTRAMUSCULAR at 17:50

## 2022-06-01 NOTE — PDOC
[employer] PROGRESS NOTE


Date of Service


DOS:


Late entry May 31





Chief Complaint


Chief Complaint


Assessment/Plan


Sepsis


Acute pyelonephritis


Thrombocytosis related to infectious etiology


Acute electrolyte derangementhyponatremia, hypochloremia due to volume 

depletion


Acute on chronic kidney injury due to vasomotor nephropathy, baseline creatinine

appears to be 1.3 in March 2022


Lactic acidemia


History of diabetes mellitus type 2


History of dyslipidemia


History of atrial fibrillation, not on any blood thinners or rate control 

medications


History of hypertension





Admit to hospitalist service for further management


Continue IV fluids


Continue IV antibiotics


Pending urine cultures


R ISS and Accu-Cheks


Continue telemetry monitoring


Heparin for DVT prophylaxis


ADA diet


CODE STATUS full


Discussed with RN and SW


Disposition inpatient management as above


DPOA: Sister





History of Present Illness


History of Present Illness


5/31


Evaluate examined at bedside.  Resting in bed.  Discussed with her culture 

results.  Did states she is feeling a little bit weak still.  Planning for 

discharge home tomorrow.





5/30


Evaluate examined at bedside.  Resting in bed.  Continue IV antibiotics.  

Electrolytes improving.  Monitor renal function.  Discussed with bedside RN.  

Otherwise continue current.





Vitals/I&O


Vitals/I&O:





                                   Vital Signs








  Date Time  Temp Pulse Resp B/P (MAP) Pulse Ox O2 Delivery O2 Flow Rate FiO2


 


5/31/22 20:00      Room Air 94.0 


 


5/31/22 19:00 97.8 85 18 142/63 (89) 97   





 97.8       











Physical Exam


General:  Alert, Oriented X3, Cooperative


Heart:  Regular rate


Lungs:  Clear


Abdomen:  Normal bowel sounds, Soft, No tenderness


Extremities:  No edema, Normal pulses


Skin:  No significant lesion





Labs


Labs:





Laboratory Tests








Test


 5/31/22


11:27 5/31/22


17:03 5/31/22


18:51


 


Glucose (Fingerstick)


 287 mg/dL


(70-99) 255 mg/dL


(70-99) 307 mg/dL


(70-99)











Assessment and Plan


Assessmemt and Plan


Problems


Medical Problems:


(1) CKD (chronic kidney disease)


Status: Acute  





(2) Hyperglycemia


Status: Acute  





(3) Hyponatremia


Status: Acute  





(4) Leukocytosis


Status: Acute  





(5) Pyelonephritis


Status: Acute  





(6) Sepsis


Status: Acute  





(7) UTI (urinary tract infection)


Status: Acute  











Comment


Review of Relevant


I have reviewed the following items anu (where applicable) has been applied.





Justifications for Admission


Other Justification


Acute pyelonephritis











MONICA REN MD          Jun 1, 2022 07:57